# Patient Record
Sex: FEMALE | Race: WHITE | NOT HISPANIC OR LATINO | Employment: OTHER | URBAN - METROPOLITAN AREA
[De-identification: names, ages, dates, MRNs, and addresses within clinical notes are randomized per-mention and may not be internally consistent; named-entity substitution may affect disease eponyms.]

---

## 2017-01-04 ENCOUNTER — TELEPHONE (OUTPATIENT)
Dept: FAMILY MEDICINE | Facility: CLINIC | Age: 67
End: 2017-01-04

## 2017-01-04 NOTE — TELEPHONE ENCOUNTER
Called pt, she has an appt in march for annual and didn't realize time flew by and she hadn't been in. She asked about seeing the NP and advised she can do that and explained to her that she can do all what dr schwartz does minus refill narcotics. She verbally understood and scheduled annual with lorna arevalo np

## 2017-01-04 NOTE — TELEPHONE ENCOUNTER
----- Message from Kathleen Sebastian sent at 1/4/2017  9:39 AM CST -----  Contact:  call  //[phone]    Calling to   Speak to the  Nurse about    med's and   Returning to  Physical therapy// please call

## 2017-01-13 ENCOUNTER — OFFICE VISIT (OUTPATIENT)
Dept: FAMILY MEDICINE | Facility: CLINIC | Age: 67
End: 2017-01-13
Payer: MEDICARE

## 2017-01-13 ENCOUNTER — LAB VISIT (OUTPATIENT)
Dept: LAB | Facility: HOSPITAL | Age: 67
End: 2017-01-13
Attending: FAMILY MEDICINE
Payer: MEDICARE

## 2017-01-13 VITALS
WEIGHT: 232.38 LBS | HEIGHT: 63 IN | DIASTOLIC BLOOD PRESSURE: 74 MMHG | SYSTOLIC BLOOD PRESSURE: 138 MMHG | HEART RATE: 60 BPM | BODY MASS INDEX: 41.18 KG/M2

## 2017-01-13 DIAGNOSIS — M50.30 DDD (DEGENERATIVE DISC DISEASE), CERVICAL: ICD-10-CM

## 2017-01-13 DIAGNOSIS — M51.36 DDD (DEGENERATIVE DISC DISEASE), LUMBAR: ICD-10-CM

## 2017-01-13 DIAGNOSIS — R60.0 BILATERAL EDEMA OF LOWER EXTREMITY: ICD-10-CM

## 2017-01-13 DIAGNOSIS — E66.01 MORBID OBESITY DUE TO EXCESS CALORIES: Primary | ICD-10-CM

## 2017-01-13 DIAGNOSIS — Z00.00 ROUTINE MEDICAL EXAM: ICD-10-CM

## 2017-01-13 DIAGNOSIS — Z13.6 ENCOUNTER FOR SCREENING FOR CARDIOVASCULAR DISORDERS: ICD-10-CM

## 2017-01-13 DIAGNOSIS — K21.9 GASTROESOPHAGEAL REFLUX DISEASE WITHOUT ESOPHAGITIS: ICD-10-CM

## 2017-01-13 DIAGNOSIS — I73.00 RAYNAUD'S DISEASE WITHOUT GANGRENE: ICD-10-CM

## 2017-01-13 LAB
ALBUMIN SERPL BCP-MCNC: 3.4 G/DL
ALP SERPL-CCNC: 90 U/L
ALT SERPL W/O P-5'-P-CCNC: 8 U/L
ANION GAP SERPL CALC-SCNC: 8 MMOL/L
AST SERPL-CCNC: 17 U/L
BILIRUB SERPL-MCNC: 0.5 MG/DL
BUN SERPL-MCNC: 19 MG/DL
CALCIUM SERPL-MCNC: 9.1 MG/DL
CHLORIDE SERPL-SCNC: 106 MMOL/L
CHOLEST/HDLC SERPL: 4.2 {RATIO}
CO2 SERPL-SCNC: 28 MMOL/L
CREAT SERPL-MCNC: 0.8 MG/DL
EST. GFR  (AFRICAN AMERICAN): >60 ML/MIN/1.73 M^2
EST. GFR  (NON AFRICAN AMERICAN): >60 ML/MIN/1.73 M^2
GLUCOSE SERPL-MCNC: 85 MG/DL
HDL/CHOLESTEROL RATIO: 24 %
HDLC SERPL-MCNC: 196 MG/DL
HDLC SERPL-MCNC: 47 MG/DL
LDLC SERPL CALC-MCNC: 124.2 MG/DL
NONHDLC SERPL-MCNC: 149 MG/DL
POTASSIUM SERPL-SCNC: 4.7 MMOL/L
PROT SERPL-MCNC: 6.6 G/DL
SODIUM SERPL-SCNC: 142 MMOL/L
TRIGL SERPL-MCNC: 124 MG/DL

## 2017-01-13 PROCEDURE — 99499 UNLISTED E&M SERVICE: CPT | Mod: S$GLB,,, | Performed by: NURSE PRACTITIONER

## 2017-01-13 PROCEDURE — 80061 LIPID PANEL: CPT

## 2017-01-13 PROCEDURE — 36415 COLL VENOUS BLD VENIPUNCTURE: CPT | Mod: PO

## 2017-01-13 PROCEDURE — 99999 PR PBB SHADOW E&M-EST. PATIENT-LVL III: CPT | Mod: PBBFAC,,, | Performed by: NURSE PRACTITIONER

## 2017-01-13 PROCEDURE — 99397 PER PM REEVAL EST PAT 65+ YR: CPT | Mod: S$GLB,,, | Performed by: NURSE PRACTITIONER

## 2017-01-13 PROCEDURE — 80053 COMPREHEN METABOLIC PANEL: CPT

## 2017-01-13 RX ORDER — NIFEDIPINE 30 MG/1
30 TABLET, FILM COATED, EXTENDED RELEASE ORAL DAILY
Qty: 90 TABLET | Refills: 3 | Status: SHIPPED | OUTPATIENT
Start: 2017-01-13 | End: 2017-10-02 | Stop reason: SDUPTHER

## 2017-01-13 RX ORDER — HYDROGEN PEROXIDE 3 %
20 SOLUTION, NON-ORAL MISCELLANEOUS
COMMUNITY
End: 2020-01-01 | Stop reason: SDUPTHER

## 2017-01-13 RX ORDER — MELOXICAM 7.5 MG/1
7.5 TABLET ORAL DAILY
Qty: 30 TABLET | Refills: 5 | Status: SHIPPED | OUTPATIENT
Start: 2017-01-13 | End: 2017-10-02 | Stop reason: SDUPTHER

## 2017-01-13 RX ORDER — MELOXICAM 7.5 MG/1
7.5 TABLET ORAL DAILY
COMMUNITY
End: 2017-01-13 | Stop reason: SDUPTHER

## 2017-01-13 NOTE — PROGRESS NOTES
"Subjective:       Patient ID: Dari Polanco is a 66 y.o. female.    Chief Complaint: Annual Exam and Medication Refill (mobic, nefidipine)    HPI Comments: Here today for routine medical exam. Doing well    Past Medical History:    Arthritis                                                     Bronchitis                                                    Cancer                                                          Comment:history of basal skin cancer status post                excision    GERD (gastroesophageal reflux disease)                        Obesity                                                       Raynaud's syndrome                                            Retinal migraine                                              Tobacco dependence                                            Past Surgical History:    BACK SURGERY                                                    SECTION, CLASSIC                                      UPPER GASTROINTESTINAL ENDOSCOPY                 ?  (R*      Comment:"Acid reflux"    COLONOSCOPY                                      ?  (R*      Comment:"Normal"    Review of patient's family history indicates:    Esophageal cancer              Father                    Cancer                         Mother                      Comment: 'spinal column'    Diabetes                       Maternal Grandmother      Heart disease                  Maternal Grandfather        Social History    Marital status: Single              Spouse name:                       Years of education:                 Number of children:               Social History Main Topics    Smoking status: Current Every Day Smoker                                                     Packs/day: 0.25      Years: 36.00          Types: Cigarettes    Smokeless status: Never Used                        Comment:  ppd    Alcohol use: No                Current Outpatient Prescriptions:  aspirin (ECOTRIN) " 81 MG EC tablet, Take 81 mg by mouth once daily., Disp: , Rfl:   esomeprazole (NEXIUM) 20 MG capsule, Take 20 mg by mouth before breakfast., Disp: , Rfl:   meloxicam (MOBIC) 7.5 MG tablet, Take 1 tablet (7.5 mg total) by mouth once daily., Disp: 30 tablet, Rfl: 5  clotrimazole-betamethasone 1-0.05% (LOTRISONE) cream, Apply topically 2 (two) times daily., Disp: 15 g, Rfl: 1  nifedipine (ADALAT CC) 30 MG TbSR, Take 1 tablet (30 mg total) by mouth once daily., Disp: 90 tablet, Rfl: 3  nystatin (MYCOSTATIN) powder, Apply topically 4 (four) times daily., Disp: 30 g, Rfl: 1    No current facility-administered medications for this visit.       Review of patient's allergies indicates:   -- Codeine -- Itching   -- Penicillins -- Rash   -- Erythromycin -- Nausea And Vomiting   -- Lidocaine     --  'feels like I am on fire'        Review of Systems   Constitutional: Negative for appetite change, fatigue and fever.   Respiratory: Negative.    Cardiovascular: Negative.    Gastrointestinal: Negative.    Neurological: Negative.        Objective:      Physical Exam   Constitutional: She is oriented to person, place, and time. She appears well-nourished. She is active and cooperative.   Cardiovascular: Normal rate, regular rhythm, normal heart sounds and intact distal pulses.    Pulmonary/Chest: Effort normal and breath sounds normal. She has no wheezes. She has no rales.   Abdominal: Soft. Bowel sounds are normal. There is no tenderness.   Musculoskeletal:        Right lower leg: She exhibits edema (1+).        Left lower leg: She exhibits edema (1+). She exhibits no tenderness.   Neurological: She is alert and oriented to person, place, and time.   Skin: Skin is warm and dry. No rash noted.   Vitals reviewed.      Assessment:       1. Morbid obesity due to excess calories    2. DDD (degenerative disc disease), lumbar    3. Raynaud's disease without gangrene    4. Gastroesophageal reflux disease without esophagitis    5. DDD  (degenerative disc disease), cervical    6. Encounter for screening for cardiovascular disorders    7. Routine medical exam    8. Bilateral edema of lower extremity        Plan:       Dari was seen today for annual exam and medication refill.    Diagnoses and all orders for this visit:    Morbid obesity due to excess calories  Encouraged healthy eating, weight loss and exercise     DDD (degenerative disc disease), lumbar  -     meloxicam (MOBIC) 7.5 MG tablet; Take 1 tablet (7.5 mg total) by mouth once daily.  -     Ambulatory Referral to Physical/Occupational Therapy    Raynaud's disease without gangrene  -     nifedipine (ADALAT CC) 30 MG TbSR; Take 1 tablet (30 mg total) by mouth once daily.    Gastroesophageal reflux disease without esophagitis  Stable- continue Nexium    DDD (degenerative disc disease), cervical  -     meloxicam (MOBIC) 7.5 MG tablet; Take 1 tablet (7.5 mg total) by mouth once daily.  -     Ambulatory Referral to Physical/Occupational Therapy    Encounter for screening for cardiovascular disorders  -     Lipid panel; Future  -     Comprehensive metabolic panel; Future    Routine medical exam  -     Lipid panel; Future  -     Comprehensive metabolic panel; Future    Bilateral edema of lower extremity  Compression stockings- given to pt  Elevate extremities. Avoid legs in dependent positions for prolonged periods

## 2017-01-13 NOTE — MR AVS SNAPSHOT
[facility]  [address]  [facility] [address]  Phone: [phone]  Fax: [phone]                  Dari Polanco   2017 7:40 AM   Office Visit    Description:  Female : 1950   Provider:  Nandini Cristina NP   Department:  [facility]           Reason for Visit     Annual Exam     Medication Refill           Diagnoses this Visit        Comments    Morbid obesity due to excess calories    -  Primary     DDD (degenerative disc disease), lumbar         Raynaud's disease without gangrene         Gastroesophageal reflux disease without esophagitis         DDD (degenerative disc disease), cervical         Encounter for screening for cardiovascular disorders         Routine medical exam         Bilateral edema of lower extremity                To Do List           Future Appointments        Provider Department Dept Phone    2017 10:45 AM [facility] [place] [facility] [phone]    3/24/2017 1:30 PM Sam Teran MD [facility] [phone]      Goals (5 Years of Data)     None       These Medications        Disp Refills Start End    meloxicam (MOBIC) 7.5 MG tablet 30 tablet 5 2017     Take 1 tablet (7.5 mg total) by mouth once daily. - Oral    Pharmacy: [facility] [address] [place] [address] AT [facility] [address] [employer] [facility] Ph #: [phone]       nifedipine (ADALAT CC) 30 MG TbSR 90 tablet 3 2017     Take 1 tablet (30 mg total) by mouth once daily. - Oral    Pharmacy: [facility] [address] [place] [address] AT [facility] [address] [employer] [facility] Ph #: [phone]         [facility]     [facility] On Call Nurse Care Line -  Assistance  Registered nurses in the [facility] provide clinical advisement, health education, appointment booking, and other advisory services.  Call for this free service at [phone].             Medications          "  START taking these NEW medications        Refills    meloxicam (MOBIC) 7.5 MG tablet 5    Sig: Take 1 tablet (7.5 mg total) by mouth once daily.    Class: Normal    Route: Oral           Verify that the below list of medications is an accurate representation of the medications you are currently taking.  If none reported, the list may be blank. If incorrect, please contact your healthcare provider. Carry this list with you in case of emergency.           Current Medications     aspirin (ECOTRIN) 81 MG EC tablet Take 81 mg by mouth once daily.    esomeprazole (NEXIUM) 20 MG capsule Take 20 mg by mouth before breakfast.    meloxicam (MOBIC) 7.5 MG tablet Take 1 tablet (7.5 mg total) by mouth once daily.    clotrimazole-betamethasone 1-0.05% (LOTRISONE) cream Apply topically 2 (two) times daily.    nifedipine (ADALAT CC) 30 MG TbSR Take 1 tablet (30 mg total) by mouth once daily.    nystatin (MYCOSTATIN) powder Apply topically 4 (four) times daily.           Clinical Reference Information           Vital Signs - Last Recorded  Most recent update: 1/13/2017  7:57 AM by Martina Hicks LPN    BP Pulse Ht Wt BMI    138/74 (BP Location: Right arm, Patient Position: Sitting, BP Method: Manual) 60 5' 3" (1.6 m) 105.4 kg (232 lb 5.8 oz) 41.16 kg/m2      Blood Pressure          Most Recent Value    BP  138/74      Allergies as of 1/13/2017     Codeine    Penicillins    Erythromycin    Lidocaine      Immunizations Administered on Date of Encounter - 1/13/2017     None      Orders Placed During Today's Visit      Normal Orders This Visit    Ambulatory Referral to Physical/Occupational Therapy     Future Labs/Procedures Expected by Expires    Comprehensive metabolic panel  1/13/2017 4/13/2017    Lipid panel  1/13/2017 3/14/2018      Smoking Cessation     If you would like to quit smoking:   You may be eligible for free services if you are a [place] resident and started smoking cigarettes before September 1, 1988.  Call the " Smoking Cessation Trust ([facility]) toll free at ([phone] [phone] or ([phone] [phone].   Call 9-800-QUIT-NOW if you do not meet the above criteria.

## 2017-03-24 ENCOUNTER — OFFICE VISIT (OUTPATIENT)
Dept: FAMILY MEDICINE | Facility: CLINIC | Age: 67
End: 2017-03-24
Payer: MEDICARE

## 2017-03-24 ENCOUNTER — HOSPITAL ENCOUNTER (OUTPATIENT)
Dept: RADIOLOGY | Facility: HOSPITAL | Age: 67
Discharge: HOME | End: 2017-03-24
Attending: FAMILY MEDICINE
Payer: MEDICARE

## 2017-03-24 VITALS
HEIGHT: 63 IN | SYSTOLIC BLOOD PRESSURE: 120 MMHG | HEART RATE: 70 BPM | TEMPERATURE: 98 F | WEIGHT: 232.81 LBS | DIASTOLIC BLOOD PRESSURE: 64 MMHG | BODY MASS INDEX: 41.25 KG/M2 | RESPIRATION RATE: 12 BRPM

## 2017-03-24 DIAGNOSIS — Z12.31 ENCOUNTER FOR SCREENING MAMMOGRAM FOR MALIGNANT NEOPLASM OF BREAST: ICD-10-CM

## 2017-03-24 DIAGNOSIS — Z00.00 ROUTINE HEALTH MAINTENANCE: Primary | ICD-10-CM

## 2017-03-24 PROCEDURE — 99397 PER PM REEVAL EST PAT 65+ YR: CPT | Mod: S$GLB,,, | Performed by: FAMILY MEDICINE

## 2017-03-24 PROCEDURE — 99499 UNLISTED E&M SERVICE: CPT | Mod: S$GLB,,, | Performed by: FAMILY MEDICINE

## 2017-03-24 PROCEDURE — 77067 SCR MAMMO BI INCL CAD: CPT | Mod: 26,,, | Performed by: RADIOLOGY

## 2017-03-24 PROCEDURE — 77063 BREAST TOMOSYNTHESIS BI: CPT | Mod: 26,,, | Performed by: RADIOLOGY

## 2017-03-24 PROCEDURE — 99999 PR PBB SHADOW E&M-EST. PATIENT-LVL III: CPT | Mod: PBBFAC,,, | Performed by: FAMILY MEDICINE

## 2017-03-24 NOTE — PROGRESS NOTES
Patient, Dari Polanco (MRN #[id number]), presented with a recent Platelet count less than 150 K/uL consistent with the definition of thrombocytopenia (ICD10 - D69.6).    Platelets   Date Value Ref Range Status   10/14/2013 143 (L) 150 - 350 K/uL Final     The patient's thrombocytopenia was monitored, evaluated, addressed and/or treated. This addendum to the medical record is made on 03/24/2017.

## 2017-03-24 NOTE — PROGRESS NOTES
"HPI  Dari Polanco is a 66 y.o. female with multiple medical diagnoses as listed in the medical history and problem list that presents for health maintenance.  .      HPI    PAST MEDICAL HISTORY:  Past Medical History:   Diagnosis Date    Arthritis     Bronchitis     Cancer     history of basal skin cancer status post excision    GERD (gastroesophageal reflux disease)     Obesity     Raynaud's syndrome     Retinal migraine     Tobacco dependence        PAST SURGICAL HISTORY:  Past Surgical History:   Procedure Laterality Date    BACK SURGERY       SECTION, CLASSIC      COLONOSCOPY  ?  ([place]?)    "Normal"    UPPER GASTROINTESTINAL ENDOSCOPY  ?  ([place]?)    "Acid reflux"       SOCIAL HISTORY:  Social History     Social History    Marital status: Single     Spouse name: N/A    Number of children: N/A    Years of education: N/A     Occupational History    Not on file.     Social History Main Topics    Smoking status: Current Every Day Smoker     Packs/day: 0.25     Years: 36.00     Types: Cigarettes    Smokeless tobacco: Never Used      Comment:  ppd    Alcohol use No    Drug use: Not on file    Sexual activity: Not on file     Other Topics Concern    Not on file     Social History Narrative       FAMILY HISTORY:  Family History   Problem Relation Age of Onset    Esophageal cancer Father     Cancer Mother      'spinal column'    Diabetes Maternal Grandmother     Heart disease Maternal Grandfather        ALLERGIES AND MEDICATIONS: updated and reviewed.  Review of patient's allergies indicates:   Allergen Reactions    Codeine Itching    Penicillins Rash    Erythromycin Nausea And Vomiting    Lidocaine      'feels like I am on fire'     Current Outpatient Prescriptions   Medication Sig Dispense Refill    aspirin (ECOTRIN) 81 MG EC tablet Take 81 mg by mouth once daily.      clotrimazole-betamethasone 1-0.05% (LOTRISONE) cream Apply topically 2 (two) times daily. 15 " "g 1    esomeprazole (NEXIUM) 20 MG capsule Take 20 mg by mouth before breakfast.      meloxicam (MOBIC) 7.5 MG tablet Take 1 tablet (7.5 mg total) by mouth once daily. 30 tablet 5    nifedipine (ADALAT CC) 30 MG TbSR Take 1 tablet (30 mg total) by mouth once daily. 90 tablet 3    nystatin (MYCOSTATIN) powder Apply topically 4 (four) times daily. 30 g 1     No current facility-administered medications for this visit.        ROS  Review of Systems   Constitutional: Negative for fatigue, fever and unexpected weight change.   HENT: Negative for congestion, hearing loss, rhinorrhea and sore throat.    Eyes: Negative for visual disturbance.   Respiratory: Negative for cough, chest tightness, shortness of breath and wheezing.    Cardiovascular: Negative for chest pain, palpitations and leg swelling.   Gastrointestinal: Negative for abdominal distention, abdominal pain, blood in stool, constipation, diarrhea, nausea and vomiting.   Genitourinary: Negative for difficulty urinating, dysuria, frequency, hematuria, menstrual problem, pelvic pain, urgency and vaginal bleeding.   Musculoskeletal: Negative for back pain, joint swelling and neck pain.   Skin: Negative for rash.   Neurological: Negative for dizziness, tremors, weakness, light-headedness, numbness and headaches.   Psychiatric/Behavioral: Negative for confusion, dysphoric mood and sleep disturbance. The patient is nervous/anxious (with mild memory loss).        Physical Exam  Vitals:    03/24/17 1347   BP: 120/64   Pulse: 70   Resp: 12   Temp: 98.3 °F (36.8 °C)    Body mass index is 41.24 kg/(m^2).  Weight: 105.6 kg (232 lb 12.9 oz)   Height: 5' 3" (160 cm)     Physical Exam   Constitutional: She is oriented to person, place, and time. She appears well-developed and well-nourished.   HENT:   Head: Normocephalic and atraumatic.   Right Ear: External ear normal.   Left Ear: External ear normal.   Nose: Nose normal.   Mouth/Throat: Oropharynx is clear and moist. "   Eyes: Conjunctivae and EOM are normal. Pupils are equal, round, and reactive to light. No scleral icterus.   Neck: Normal range of motion. Neck supple. No JVD present. No thyromegaly present.   Cardiovascular: Normal rate, regular rhythm and normal heart sounds.  Exam reveals no gallop and no friction rub.    No murmur heard.  Pulmonary/Chest: Effort normal and breath sounds normal. She has no wheezes. She has no rales.   Abdominal: Soft. Bowel sounds are normal. She exhibits no distension and no mass. There is no tenderness. There is no rebound and no guarding.   Musculoskeletal: Normal range of motion. She exhibits no edema.   Lymphadenopathy:     She has no cervical adenopathy.   Neurological: She is alert and oriented to person, place, and time. She has normal strength. No cranial nerve deficit or sensory deficit.   Skin: Skin is warm and dry. No rash noted.   Vitals reviewed.    Results for orders placed or performed in visit on 01/13/17   Lipid panel   Result Value Ref Range    Cholesterol 196 120 - 199 mg/dL    Triglycerides 124 30 - 150 mg/dL    HDL 47 40 - 75 mg/dL    LDL Cholesterol 124.2 63.0 - 159.0 mg/dL    HDL/Chol Ratio 24.0 20.0 - 50.0 %    Total Cholesterol/HDL Ratio 4.2 2.0 - 5.0    Non-HDL Cholesterol 149 mg/dL   Comprehensive metabolic panel   Result Value Ref Range    Sodium 142 136 - 145 mmol/L    Potassium 4.7 3.5 - 5.1 mmol/L    Chloride 106 95 - 110 mmol/L    CO2 28 23 - 29 mmol/L    Glucose 85 70 - 110 mg/dL    BUN, Bld 19 8 - 23 mg/dL    Creatinine 0.8 0.5 - 1.4 mg/dL    Calcium 9.1 8.7 - 10.5 mg/dL    Total Protein 6.6 6.0 - 8.4 g/dL    Albumin 3.4 (L) 3.5 - 5.2 g/dL    Total Bilirubin 0.5 0.1 - 1.0 mg/dL    Alkaline Phosphatase 90 55 - 135 U/L    AST 17 10 - 40 U/L    ALT 8 (L) 10 - 44 U/L    Anion Gap 8 8 - 16 mmol/L    eGFR if African American >60.0 >60 mL/min/1.73 m^2    eGFR if non African American >60.0 >60 mL/min/1.73 m^2         Health Maintenance       Date Due Completion Date     TETANUS VACCINE 7/13/1966 ---    Mammogram 1/29/2017 1/29/2016    Override on 6/3/2013: Done    DEXA SCAN 10/23/2018 10/23/2015    Override on 5/16/2013: (N/S)    Lipid Panel 1/13/2022 1/13/2017    Colonoscopy 12/16/2023 12/16/2013          Assessment & Plan    Routine health maintenance  - Health maintenance reviewed  - Diet and exercise education.  - Tobacco cessation education      Return in about 1 year (around 3/24/2018).

## 2017-03-24 NOTE — MR AVS SNAPSHOT
[facility]  [address]  [place] [address]  Phone: [phone]  Fax: [phone]                  Dari Polanco   3/24/2017 1:30 PM   Office Visit    Description:  Female : 1950   Provider:  Sam Teran MD   Department:  [facility]           Diagnoses this Visit        Comments    Routine health maintenance    -  Primary            To Do List           Future Appointments        Provider Department Dept Phone    2017 9:00 AM Sam Teran MD [facility] [phone]      Goals (5 Years of Data)     None      Follow-Up and Disposition     Return in about 1 year (around 3/24/2018).    Follow-up and Disposition History      [facility] On [facility]     [facility] On Call Nurse Care Line -  Assistance  Registered nurses in the [facility] provide clinical advisement, health education, appointment booking, and other advisory services.  Call for this free service at [phone].             Medications                Verify that the below list of medications is an accurate representation of the medications you are currently taking.  If none reported, the list may be blank. If incorrect, please contact your healthcare provider. Carry this list with you in case of emergency.           Current Medications     aspirin (ECOTRIN) 81 MG EC tablet Take 81 mg by mouth once daily.    clotrimazole-betamethasone 1-0.05% (LOTRISONE) cream Apply topically 2 (two) times daily.    esomeprazole (NEXIUM) 20 MG capsule Take 20 mg by mouth before breakfast.    meloxicam (MOBIC) 7.5 MG tablet Take 1 tablet (7.5 mg total) by mouth once daily.    nifedipine (ADALAT CC) 30 MG TbSR Take 1 tablet (30 mg total) by mouth once daily.    nystatin (MYCOSTATIN) powder Apply topically 4 (four) times daily.           Clinical Reference Information           Your Vitals Were     BP Pulse Temp Resp Height Weight    120/64 (BP Location: Left arm, Patient  "Position: Sitting) 70 98.3 °F (36.8 °C) (Oral) 12 5' 3" (1.6 m) 105.6 kg (232 lb 12.9 oz)    BMI                41.24 kg/m2          Blood Pressure          Most Recent Value    BP  120/64      Allergies as of 3/24/2017     Codeine    Penicillins    Erythromycin    Lidocaine      Immunizations Administered on Date of Encounter - 3/24/2017     None      Smoking Cessation     If you would like to quit smoking:   You may be eligible for free services if you are a [place] resident and started smoking cigarettes before September 1, 1988.  Call the Smoking Cessation Trust ([facility]) toll free at (688) 779-1343 or (192) 480-8738.   Call 1-800-QUIT-NOW if you do not meet the above criteria.            Language Assistance Services     ATTENTION: Language assistance services are available, free of charge. Please call [phone].      ATENCIÓN: Si habla fabiolaañol, tiene a eagle disposición servicios gratuitos de asistencia lingüística. Llame al [phone].     JACEY Ý: N?u b?n nói Ti?ng Vi?t, có các d?ch v? h? tr? ngôn ng? mi?n phí dành cho b?n. G?i s? [phone].         [facility] complies with applicable Federal civil rights laws and does not discriminate on the basis of race, color, national origin, age, disability, or sex.        "

## 2017-08-24 ENCOUNTER — OFFICE VISIT (OUTPATIENT)
Dept: FAMILY MEDICINE | Facility: CLINIC | Age: 67
End: 2017-08-24
Payer: MEDICARE

## 2017-08-24 VITALS
SYSTOLIC BLOOD PRESSURE: 117 MMHG | DIASTOLIC BLOOD PRESSURE: 66 MMHG | WEIGHT: 230.38 LBS | BODY MASS INDEX: 40.81 KG/M2 | HEART RATE: 66 BPM

## 2017-08-24 DIAGNOSIS — Z00.00 ENCOUNTER FOR PREVENTIVE HEALTH EXAMINATION: Primary | ICD-10-CM

## 2017-08-24 DIAGNOSIS — I87.2 VENOUS INSUFFICIENCY: ICD-10-CM

## 2017-08-24 DIAGNOSIS — I73.00 RAYNAUD'S DISEASE WITHOUT GANGRENE: ICD-10-CM

## 2017-08-24 DIAGNOSIS — K21.9 GASTROESOPHAGEAL REFLUX DISEASE WITHOUT ESOPHAGITIS: ICD-10-CM

## 2017-08-24 DIAGNOSIS — M51.36 DDD (DEGENERATIVE DISC DISEASE), LUMBAR: ICD-10-CM

## 2017-08-24 DIAGNOSIS — E66.01 MORBID OBESITY DUE TO EXCESS CALORIES: ICD-10-CM

## 2017-08-24 DIAGNOSIS — Z85.828 HISTORY OF SKIN CANCER: ICD-10-CM

## 2017-08-24 DIAGNOSIS — Z72.0 TOBACCO USE: ICD-10-CM

## 2017-08-24 DIAGNOSIS — M50.30 DDD (DEGENERATIVE DISC DISEASE), CERVICAL: ICD-10-CM

## 2017-08-24 PROCEDURE — 99999 PR PBB SHADOW E&M-EST. PATIENT-LVL III: CPT | Mod: PBBFAC,,, | Performed by: NURSE PRACTITIONER

## 2017-08-24 PROCEDURE — 99499 UNLISTED E&M SERVICE: CPT | Mod: S$GLB,,, | Performed by: NURSE PRACTITIONER

## 2017-08-24 PROCEDURE — G0439 PPPS, SUBSEQ VISIT: HCPCS | Mod: S$GLB,,, | Performed by: NURSE PRACTITIONER

## 2017-08-24 NOTE — PATIENT INSTRUCTIONS
Counseling and Referral of Other Preventative  (Italic type indicates deductible and co-insurance are waived)    Patient Name: Dari Polanco  Today's Date: 8/24/2017      SERVICE LIMITATIONS RECOMMENDATION    Vaccines    · Pneumococcal (once after 65)    · Influenza (annually)    · Hepatitis B (if medium/high risk)    · Prevnar 13      Hepatitis B medium/high risk factors:       - End-stage renal disease       - Hemophiliacs who received Factor VII or         IX concentrates       - Clients of institutions for the mentally             retarded       - Persons who live in the same house as          a HepB carrier       - Homosexual men       - Illicit injectable drug abusers     Pneumococcal: Done, no repeat necessary     Influenza: Recommended to patient (september/october)     Hepatitis B: N/A     Prevnar 13: Done, no repeat necessary    Mammogram (biennial age 50-74)  Annually (age 40 or over)  Done this year, repeat every year (3/2018)    Pap (up to age 70 and after 70 if unknown history or abnormal study last 10 years)    N/A     The USPSTF recommends against screening for cervical cancer in women older than age 65 years who have had adequate prior screening and are not otherwise at high risk for cervical cancer.      Colorectal cancer screening (to age 75)    · Fecal occult blood test (annual)  · Flexible sigmoidoscopy (5y)  · Screening colonoscopy (10y)  · Barium enema   Last done 12/2013, recommend to repeat every 7-8 years  [id number]    Diabetes self-management training (no USPSTF recommendations)  Requires referral by treating physician for patient with diabetes or renal disease. 10 hours of initial DSMT sessions of no less than 30 minutes each in a continuous 12-month period. 2 hours of follow-up DSMT in subsequent years.  N/A    Bone mass measurements (age 65 & older, biennial)  Requires diagnosis related to osteoporosis or estrogen deficiency. Biennial benefit unless patient has history of long-term  glucocorticoid  Last done 10/2015, recommend to repeat every 3 years  10/2018    Glaucoma screening (no USPSTF recommendation)  Diabetes mellitus, family history   , age 50 or over    American, age 65 or over  N/A    Medical nutrition therapy for diabetes or renal disease (no recommended schedule)  Requires referral by treating physician for patient with diabetes or renal disease or kidney transplant within the past 3 years.  Can be provided in same year as diabetes self-management training (DSMT), and CMS recommends medical nutrition therapy take place after DSMT. Up to 3 hours for initial year and 2 hours in subsequent years.  N/A    Cardiovascular screening blood tests (every 5 years)  · Fasting lipid panel  Order as a panel if possible  Done this year, repeat every year (1/2018)    Diabetes screening tests (at least every 3 years, Medicare covers annually or at 6-month intervals for prediabetic patients)  · Fasting blood sugar (FBS) or glucose tolerance test (GTT)  Patient must be diagnosed with one of the following:       - Hypertension       - Dyslipidemia       - Obesity (BMI 30kg/m2)       - Previous elevated impaired FBS or GTT       ... or any two of the following:       - Overweight (BMI 25 but <30)       - Family history of diabetes       - Age 65 or older       - History of gestational diabetes or birth of baby weighing more than 9 pounds Done this year, repeat every year (1/2018)    HIV screening (annually for increased risk patients)  · HIV-1 and HIV-2 by EIA, or PASTOR, rapid antibody test or oral mucosa transudate  Patients must be at increased risk for HIV infection per USPSTF guidelines or pregnant. Tests covered annually for patient at increased risk or as requested by the patient. Pregnant patients may receive up to 3 tests during pregnancy.  Risks discussed, screening is not recommended    Smoking cessation counseling (up to 8 sessions per year)  Patients must be  asymptomatic of tobacco-related conditions to receive as a preventative service.  Counseled for 3-10 minutes.    Subsequent annual wellness visit  At least 12 months since last AWV  Return in one year     The following information is provided to all patients.  This information is to help you find resources for any of the problems found today that may be affecting your health:                Living healthy guide: www.[facility].[place].[facility]      Understanding Diabetes: www.diabetes.org      Eating healthy: www.cdc.gov/healthyweight      CDC home safety checklist: www.cdc.gov/steadi/patient.html      Agency on Aging: www.goea.[place].[facility]      Alcoholics anonymous (AA): www.aa.org      Physical Activity: www.naomie.nih.gov/qc4rdgv      Tobacco use: www.quitwithusla.org

## 2017-08-24 NOTE — PROGRESS NOTES
Dari Polanco presented for a  Medicare AWV and comprehensive Health Risk Assessment today. The following components were reviewed and updated:    · Medical history  · Family History  · Social history  · Allergies and Current Medications  · Health Risk Assessment  · Health Maintenance  · Care Team     Review of Systems   Constitutional: Negative for fever and malaise/fatigue.   Respiratory: Negative for cough and shortness of breath.    Cardiovascular: Negative for chest pain and leg swelling.   Gastrointestinal: Negative for abdominal pain, blood in stool, constipation, diarrhea, nausea and vomiting.   Skin: Negative for rash.   Neurological: Negative for dizziness and weakness.     * See Completed Assessments for Annual Wellness Visit within the encounter summary.**     The following assessments were completed:  · Living Situation  · CAGE  · Depression Screening  · Timed Get Up and Go  · Whisper Test  · Cognitive Function Screening      · Nutrition Screening  · ADL Screening  · PAQ Screening    Vitals:    08/24/17 1259   BP: 117/66   Pulse: 66   Weight: 104.5 kg (230 lb 6.1 oz)     Body mass index is 40.81 kg/m².  Physical Exam   Constitutional: She is oriented to person, place, and time. She appears well-nourished.   Cardiovascular: Normal rate, regular rhythm, normal heart sounds and intact distal pulses.    Pulmonary/Chest: Effort normal and breath sounds normal. She has no wheezes. She has no rales.   Neurological: She is alert and oriented to person, place, and time.   Skin: Skin is warm and dry. No rash noted.   Vitals reviewed.        Diagnoses and health risks identified today and associated recommendations/orders:    1. Encounter for preventive health examination  Reviewed and discussed health maintenance.   Written rx given to patient to update tetanus vaccine    2. Tobacco use  Discussed smoking cessation. Not willing to quit smoking at this time    3. Gastroesophageal reflux disease without  esophagitis  Stable- continue current treatment and routine follow ups with PCP    4. Raynaud's disease without gangrene  Stable- continue current treatment and routine follow ups with PCP    5. Venous insufficiency  Stable- continue current treatment and routine follow ups with PCP  Compression stocking  Elevate extremities    6. DDD (degenerative disc disease), lumbar  Stable- no new complaints. Follow up prn    7. DDD (degenerative disc disease), cervical  Stable- no new complaints. Follow up prn    8. Morbid obesity due to excess calories  Encouraged healthy eating, weight loss and routine exercise    9. History of skin cancer  Routinely yearly follow ups with derm  (Dr. Navarrete)    Provided Dari with a 5-10 year written screening schedule and personal prevention plan. Recommendations were developed using the USPSTF age appropriate recommendations. Education, counseling, and referrals were provided as needed. After Visit Summary printed and given to patient which includes a list of additional screenings\tests needed.    Nandini Cristina NP

## 2017-09-27 ENCOUNTER — LAB VISIT (OUTPATIENT)
Dept: LAB | Facility: HOSPITAL | Age: 67
End: 2017-09-27
Attending: FAMILY MEDICINE
Payer: MEDICARE

## 2017-09-27 ENCOUNTER — OFFICE VISIT (OUTPATIENT)
Dept: FAMILY MEDICINE | Facility: CLINIC | Age: 67
End: 2017-09-27
Payer: MEDICARE

## 2017-09-27 VITALS
HEIGHT: 63 IN | HEART RATE: 72 BPM | BODY MASS INDEX: 40.08 KG/M2 | WEIGHT: 226.19 LBS | DIASTOLIC BLOOD PRESSURE: 68 MMHG | SYSTOLIC BLOOD PRESSURE: 112 MMHG

## 2017-09-27 DIAGNOSIS — R53.83 FATIGUE, UNSPECIFIED TYPE: Primary | ICD-10-CM

## 2017-09-27 DIAGNOSIS — R53.83 FATIGUE, UNSPECIFIED TYPE: ICD-10-CM

## 2017-09-27 LAB — TSH SERPL DL<=0.005 MIU/L-ACNC: 1.56 UIU/ML

## 2017-09-27 PROCEDURE — 99213 OFFICE O/P EST LOW 20 MIN: CPT | Mod: S$GLB,,, | Performed by: FAMILY MEDICINE

## 2017-09-27 PROCEDURE — 3008F BODY MASS INDEX DOCD: CPT | Mod: S$GLB,,, | Performed by: FAMILY MEDICINE

## 2017-09-27 PROCEDURE — G0008 ADMIN INFLUENZA VIRUS VAC: HCPCS | Mod: S$GLB,,, | Performed by: FAMILY MEDICINE

## 2017-09-27 PROCEDURE — 1159F MED LIST DOCD IN RCRD: CPT | Mod: S$GLB,,, | Performed by: FAMILY MEDICINE

## 2017-09-27 PROCEDURE — 84443 ASSAY THYROID STIM HORMONE: CPT

## 2017-09-27 PROCEDURE — 1126F AMNT PAIN NOTED NONE PRSNT: CPT | Mod: S$GLB,,, | Performed by: FAMILY MEDICINE

## 2017-09-27 PROCEDURE — 90662 IIV NO PRSV INCREASED AG IM: CPT | Mod: S$GLB,,, | Performed by: FAMILY MEDICINE

## 2017-09-27 PROCEDURE — 99999 PR PBB SHADOW E&M-EST. PATIENT-LVL III: CPT | Mod: PBBFAC,,, | Performed by: FAMILY MEDICINE

## 2017-09-27 PROCEDURE — 36415 COLL VENOUS BLD VENIPUNCTURE: CPT | Mod: PO

## 2017-09-27 NOTE — MEDICAL/APP STUDENT
Subjective:       Patient ID: Dari Polanco is a 67 y.o. female.    Chief Complaint: Fatigue and Leg Pain (R leg)    6 Month follow up with new complaint of 'busted vein' in medial portion of lower leg. Complaining of leg pain and new superficial veins 2-3 days ago in this area. History of blood clot in her leg after her 3rd child.      Review of Systems   Constitutional: Positive for fatigue. Negative for fever and unexpected weight change.   HENT: Negative for postnasal drip, rhinorrhea, sinus pain and sore throat.    Eyes: Negative.    Respiratory: Negative for apnea, cough, choking, chest tightness and shortness of breath.    Cardiovascular: Negative for chest pain, palpitations and leg swelling.   Gastrointestinal: Negative for abdominal distention, abdominal pain, constipation, diarrhea and nausea.   Endocrine: Negative.    Genitourinary: Negative for difficulty urinating, flank pain and genital sores.   Musculoskeletal: Positive for myalgias. Negative for back pain.   Skin: Positive for color change (Superficial veins).   Allergic/Immunologic: Negative.    Neurological: Negative.    Hematological: Negative.    Psychiatric/Behavioral: Negative.        Objective:       Vitals:    09/27/17 0847   BP: 112/68   Pulse: 72     Physical Exam   Constitutional: She appears well-developed and well-nourished.   HENT:   Head: Normocephalic and atraumatic.   Right Ear: External ear normal.   Left Ear: External ear normal.   Nose: Nose normal.   Mouth/Throat: Oropharynx is clear and moist.   Eyes: Conjunctivae and EOM are normal. Pupils are equal, round, and reactive to light.   Neck: Normal range of motion. Neck supple.   Cardiovascular: Normal rate, regular rhythm, normal heart sounds and intact distal pulses.    Pulmonary/Chest: Effort normal and breath sounds normal.   Abdominal: Soft. Bowel sounds are normal.   Musculoskeletal: Normal range of motion.   Neurological: She is alert.   Skin: Skin is warm and dry.    Edema 2+ to thigh       Assessment:   -Raynaud's  -GERD  -Obesity    Plan:     -Lipids, CBC, CMP, TSH    Lizabeth ANAND4    Lizabeth Faulknre MS4

## 2017-10-01 NOTE — PROGRESS NOTES
Subjective:       Patient ID: Dari Polanco is a 67 y.o. female    Chief Complaint: Fatigue and Leg Pain (R leg)    HPI  Patient here for interval evaluation. She has no new complaints but has continued difficulty with fatigue  She reports no chest pain, palpitations or dyspnea.  She has mild lower limb discomfort, slightly worse on the right.    Review of Systems      Objective:   Physical Exam   Constitutional: She appears well-developed and well-nourished.   HENT:   Head: Normocephalic and atraumatic.   Eyes: Conjunctivae and EOM are normal. Pupils are equal, round, and reactive to light. No scleral icterus.   Neck: Normal range of motion. Neck supple. No thyromegaly present.   Cardiovascular: Normal rate, regular rhythm and normal heart sounds.  Exam reveals no gallop and no friction rub.    No murmur heard.  Pulmonary/Chest: Effort normal and breath sounds normal. No respiratory distress. She has no wheezes. She has no rales.   Lymphadenopathy:     She has no cervical adenopathy.   Vitals reviewed.        Assessment:       1. Fatigue, unspecified type  TSH         Plan:       Fatigue, unspecified type  -     TSH; Future; Expected date: 09/27/2017  - Discussed possible etiologies, which likely represent a combination of stress and debility    Other orders  -     Influenza - High Dose (65+) (PF) (IM)

## 2017-10-02 DIAGNOSIS — M51.36 DDD (DEGENERATIVE DISC DISEASE), LUMBAR: ICD-10-CM

## 2017-10-02 DIAGNOSIS — I73.00 RAYNAUD'S DISEASE WITHOUT GANGRENE: ICD-10-CM

## 2017-10-02 DIAGNOSIS — M50.30 DDD (DEGENERATIVE DISC DISEASE), CERVICAL: ICD-10-CM

## 2017-10-02 RX ORDER — MELOXICAM 7.5 MG/1
7.5 TABLET ORAL DAILY
Qty: 30 TABLET | Refills: 3 | Status: SHIPPED | OUTPATIENT
Start: 2017-10-02 | End: 2018-06-08 | Stop reason: SDUPTHER

## 2017-10-02 RX ORDER — NIFEDIPINE 30 MG/1
30 TABLET, FILM COATED, EXTENDED RELEASE ORAL DAILY
Qty: 90 TABLET | Refills: 3 | Status: SHIPPED | OUTPATIENT
Start: 2017-10-02 | End: 2018-11-20 | Stop reason: SDUPTHER

## 2018-01-09 ENCOUNTER — TELEPHONE (OUTPATIENT)
Dept: FAMILY MEDICINE | Facility: CLINIC | Age: 68
End: 2018-01-09

## 2018-01-09 NOTE — TELEPHONE ENCOUNTER
----- Message from Arianna Santizo sent at 1/9/2018 10:03 AM CST -----  Contact patient to advise if she received the flu and pneumonia shot this year at [phone].    Thank you

## 2018-01-09 NOTE — TELEPHONE ENCOUNTER
Spoke to patient and stated to her that she received both pneumonia vaccines and flu shot. Verbalized understanding.

## 2018-04-26 ENCOUNTER — OFFICE VISIT (OUTPATIENT)
Dept: FAMILY MEDICINE | Facility: CLINIC | Age: 68
End: 2018-04-26
Payer: MEDICARE

## 2018-04-26 VITALS
WEIGHT: 221.56 LBS | HEART RATE: 76 BPM | DIASTOLIC BLOOD PRESSURE: 67 MMHG | SYSTOLIC BLOOD PRESSURE: 127 MMHG | HEIGHT: 63 IN | BODY MASS INDEX: 39.26 KG/M2

## 2018-04-26 VITALS
HEART RATE: 76 BPM | SYSTOLIC BLOOD PRESSURE: 127 MMHG | DIASTOLIC BLOOD PRESSURE: 67 MMHG | HEIGHT: 63 IN | WEIGHT: 221.56 LBS | BODY MASS INDEX: 39.26 KG/M2

## 2018-04-26 DIAGNOSIS — I87.2 VENOUS INSUFFICIENCY: ICD-10-CM

## 2018-04-26 DIAGNOSIS — Z00.00 ENCOUNTER FOR PREVENTIVE HEALTH EXAMINATION: Primary | ICD-10-CM

## 2018-04-26 DIAGNOSIS — R60.9 DEPENDENT EDEMA: ICD-10-CM

## 2018-04-26 DIAGNOSIS — E66.01 MORBID OBESITY DUE TO EXCESS CALORIES: ICD-10-CM

## 2018-04-26 DIAGNOSIS — Z72.0 TOBACCO USE: ICD-10-CM

## 2018-04-26 DIAGNOSIS — K21.9 GASTROESOPHAGEAL REFLUX DISEASE WITHOUT ESOPHAGITIS: ICD-10-CM

## 2018-04-26 DIAGNOSIS — I73.00 RAYNAUD'S DISEASE WITHOUT GANGRENE: ICD-10-CM

## 2018-04-26 DIAGNOSIS — B37.2 CANDIDIASIS, INTERTRIGINOUS: Primary | ICD-10-CM

## 2018-04-26 PROCEDURE — 99214 OFFICE O/P EST MOD 30 MIN: CPT | Mod: S$GLB,,, | Performed by: PHYSICIAN ASSISTANT

## 2018-04-26 PROCEDURE — G0439 PPPS, SUBSEQ VISIT: HCPCS | Mod: S$GLB,,, | Performed by: NURSE PRACTITIONER

## 2018-04-26 PROCEDURE — 99999 PR PBB SHADOW E&M-EST. PATIENT-LVL IV: CPT | Mod: PBBFAC,,, | Performed by: NURSE PRACTITIONER

## 2018-04-26 PROCEDURE — 99499 UNLISTED E&M SERVICE: CPT | Mod: S$PBB,,, | Performed by: NURSE PRACTITIONER

## 2018-04-26 PROCEDURE — 99999 PR PBB SHADOW E&M-EST. PATIENT-LVL III: CPT | Mod: PBBFAC,,, | Performed by: PHYSICIAN ASSISTANT

## 2018-04-26 RX ORDER — FLUCONAZOLE 150 MG/1
150 TABLET ORAL DAILY
Qty: 1 TABLET | Refills: 0 | Status: SHIPPED | OUTPATIENT
Start: 2018-04-26 | End: 2018-04-27

## 2018-04-26 RX ORDER — CLOTRIMAZOLE AND BETAMETHASONE DIPROPIONATE 10; .64 MG/G; MG/G
CREAM TOPICAL 2 TIMES DAILY
Qty: 45 G | Refills: 1 | Status: SHIPPED | OUTPATIENT
Start: 2018-04-26 | End: 2018-05-17

## 2018-04-26 NOTE — PATIENT INSTRUCTIONS
Counseling and Referral of Other Preventative  (Italic type indicates deductible and co-insurance are waived)    Patient Name: Dari Polanco  Today's Date: 4/26/2018    Health Maintenance       Date Due Completion Date    Mammogram 03/27/2018 3/27/2017    Override on 6/3/2013: Done    DEXA SCAN 10/23/2018 10/23/2015    Override on 5/16/2013: (N/S)    Colonoscopy 12/16/2020 12/16/2013    Lipid Panel 01/13/2022 1/13/2017    TETANUS VACCINE 08/24/2027 8/24/2017        No orders of the defined types were placed in this encounter.    The following information is provided to all patients.  This information is to help you find resources for any of the problems found today that may be affecting your health:                Living healthy guide: www.[facility].[place].gov      Understanding Diabetes: www.diabetes.org      Eating healthy: www.cdc.gov/healthyweight      [facility] home safety checklist: www.cdc.gov/steadi/patient.html      Agency on Aging: www.goea.[place].[facility]      Alcoholics anonymous (AA): www.aa.org      Physical Activity: www.naomie.nih.gov/fe8aomr      Tobacco use: www.quitwithusla.org

## 2018-04-26 NOTE — PROGRESS NOTES
"aDri Polanco presented for a  Medicare AWV and comprehensive Health Risk Assessment today. The following components were reviewed and updated:    · Medical history  · Family History  · Social history  · Allergies and Current Medications  · Health Risk Assessment  · Health Maintenance  · Care Team     Review of Systems   Constitutional: Negative for chills, fever, malaise/fatigue and weight loss.   Respiratory: Negative for cough and wheezing.    Cardiovascular: Negative for chest pain.   Gastrointestinal: Negative for abdominal pain, blood in stool, constipation, diarrhea, nausea and vomiting.   Skin: Negative for rash.   Neurological: Negative for dizziness, weakness and headaches.     ** See Completed Assessments for Annual Wellness Visit within the encounter summary.**     The following assessments were completed:  · Living Situation  · CAGE  · Depression Screening  · Timed Get Up and Go  · Whisper Test  · Cognitive Function Screening      · Nutrition Screening  · ADL Screening  · PAQ Screening    Vitals:    04/26/18 0747   BP: 127/67   BP Location: Left arm   Patient Position: Sitting   BP Method: Large (Automatic)   Pulse: 76   Weight: 100.5 kg (221 lb 9 oz)   Height: 5' 3" (1.6 m)     Body mass index is 39.25 kg/m².  Physical Exam   Constitutional: She is oriented to person, place, and time. She appears well-nourished.   Cardiovascular: Normal rate, regular rhythm, normal heart sounds and intact distal pulses.    Pulmonary/Chest: Effort normal and breath sounds normal. She has no wheezes. She has no rales.   Neurological: She is alert and oriented to person, place, and time.   Skin: Skin is warm and dry.   Vitals reviewed.        Diagnoses and health risks identified today and associated recommendations/orders:    1. Encounter for preventive health examination  Reviewed and discussed health maintenance.      2. Raynaud's disease without gangrene  Stable- continue current treatment and follow up routinely with " PCP     3. Venous insufficiency  Stable- continue current treatment and follow up routinely with PCP   Compression stockings, elevate extremities    4. Morbid obesity due to excess calories  Encouraged healthy eating, weight loss and routine exercise     5. Gastroesophageal reflux disease without esophagitis  Stable- continue current treatment and follow up routinely with PCP     6. Tobacco use  Discussed smoking cessation program. Patient declines at this time due to transportation issues    I offered to discuss end of life issues, including information on how to make advance directives that the patient could use to name someone who would make medical decisions on their behalf if they became too ill to make themselves.    ___Patient declined  _X_Patient is interested, I provided paper work and offered to discuss.    Provided Dari with a 5-10 year written screening schedule and personal prevention plan. Recommendations were developed using the USPSTF age appropriate recommendations. Education, counseling, and referrals were provided as needed. After Visit Summary printed and given to patient which includes a list of additional screenings\tests needed.    Nandini Cristina, NP

## 2018-04-26 NOTE — PROGRESS NOTES
Subjective:       Patient ID: Dari Polanco is a 67 y.o. female    Chief Complaint: Leg Swelling (dry legs,red and hot in inside of ankles,also thinks might have a infection under belly(wants you to check and tell her what do you think))    HPI  Mrs Polanco presents today CO swelling and redness in her feet and lower legs that has been going on for many years.  She wants to make sure its nothing to be concerned about.  She denies any pain in her legs, no numbness or tingling.  Also she has a rash on her lower abdomen that is itchy and getting progressively worse.      Review of Systems   Constitutional: Negative for activity change, chills and fever.   HENT: Negative for congestion and sore throat.    Eyes: Negative for visual disturbance.   Respiratory: Negative for cough and shortness of breath.    Cardiovascular: Positive for leg swelling. Negative for chest pain and palpitations.   Gastrointestinal: Negative for abdominal pain, diarrhea, nausea and vomiting.   Endocrine: Negative for polydipsia and polyuria.   Musculoskeletal: Negative for myalgias.   Skin: Positive for rash.   Neurological: Negative for headaches.   Psychiatric/Behavioral: The patient is not nervous/anxious.         Objective:   Physical Exam   Constitutional: She is oriented to person, place, and time. She appears well-developed. No distress.   obese   HENT:   Head: Normocephalic and atraumatic.   Eyes: EOM are normal. Pupils are equal, round, and reactive to light.   Neck: Normal range of motion. Neck supple.   Cardiovascular: Normal rate, regular rhythm, normal heart sounds and intact distal pulses.  Exam reveals no gallop and no friction rub.    No murmur heard.  Pulmonary/Chest: Effort normal and breath sounds normal. No respiratory distress. She has no wheezes. She has no rales. She exhibits no tenderness.   Abdominal: Soft. Bowel sounds are normal. She exhibits no distension and no mass. There is no tenderness. There is no guarding.    Musculoskeletal: Normal range of motion. She exhibits edema (mild non-pitting edema present in the lower legs bilaterally, nmild erythema of the feet and midway up the lower legs, no rash, no calf ttp).   Neurological: She is alert and oriented to person, place, and time.   Skin: Skin is warm and dry. Rash (erythema and scaly rash at the superior pelvic area under the abdominal flap) noted. She is not diaphoretic.   Psychiatric: She has a normal mood and affect. Her behavior is normal. Judgment and thought content normal.        Assessment:       1. Candidiasis, intertriginous  clotrimazole-betamethasone 1-0.05% (LOTRISONE) cream    fluconazole (DIFLUCAN) 150 MG Tab   2. Dependent edema          Plan:       Candidiasis, intertriginous  -     clotrimazole-betamethasone 1-0.05% (LOTRISONE) cream; Apply topically 2 (two) times daily.  Dispense: 45 g; Refill: 1  -     fluconazole (DIFLUCAN) 150 MG Tab; Take 1 tablet (150 mg total) by mouth once daily.  Dispense: 1 tablet; Refill: 0    Dependent edema  - continue compression stocking and elevate feet above the level of your heart while resting

## 2018-04-26 NOTE — PROGRESS NOTES
Patient, Dari Polanco (MRN #[id number]), presented with a recent Platelet count less than 150 K/uL consistent with the definition of thrombocytopenia (ICD10 - D69.6).    Platelets   Date Value Ref Range Status   10/14/2013 143 (L) 150 - 350 K/uL Final     The patient's thrombocytopenia was monitored, evaluated, addressed and/or treated. This addendum to the medical record is made on 04/26/2018.

## 2018-06-08 DIAGNOSIS — M51.36 DDD (DEGENERATIVE DISC DISEASE), LUMBAR: ICD-10-CM

## 2018-06-08 DIAGNOSIS — M50.30 DDD (DEGENERATIVE DISC DISEASE), CERVICAL: ICD-10-CM

## 2018-06-09 RX ORDER — MELOXICAM 7.5 MG/1
TABLET ORAL
Qty: 90 TABLET | Refills: 0 | Status: SHIPPED | OUTPATIENT
Start: 2018-06-09 | End: 2018-11-20 | Stop reason: SDUPTHER

## 2018-06-11 ENCOUNTER — HOSPITAL ENCOUNTER (OUTPATIENT)
Dept: RADIOLOGY | Facility: HOSPITAL | Age: 68
Discharge: HOME | End: 2018-06-11
Attending: PHYSICIAN ASSISTANT
Payer: MEDICARE

## 2018-06-11 ENCOUNTER — OFFICE VISIT (OUTPATIENT)
Dept: FAMILY MEDICINE | Facility: CLINIC | Age: 68
End: 2018-06-11
Payer: MEDICARE

## 2018-06-11 VITALS
OXYGEN SATURATION: 99 % | BODY MASS INDEX: 38.44 KG/M2 | DIASTOLIC BLOOD PRESSURE: 78 MMHG | SYSTOLIC BLOOD PRESSURE: 126 MMHG | TEMPERATURE: 98 F | HEIGHT: 63 IN | HEART RATE: 71 BPM | WEIGHT: 216.94 LBS | RESPIRATION RATE: 18 BRPM

## 2018-06-11 DIAGNOSIS — M54.50 LOW BACK PAIN, NON-SPECIFIC: ICD-10-CM

## 2018-06-11 DIAGNOSIS — M54.40 ACUTE MIDLINE LOW BACK PAIN WITH SCIATICA, SCIATICA LATERALITY UNSPECIFIED: Primary | ICD-10-CM

## 2018-06-11 DIAGNOSIS — M62.830 LUMBAR PARASPINAL MUSCLE SPASM: ICD-10-CM

## 2018-06-11 DIAGNOSIS — M54.10 RADICULITIS: ICD-10-CM

## 2018-06-11 DIAGNOSIS — M51.36 DDD (DEGENERATIVE DISC DISEASE), LUMBAR: ICD-10-CM

## 2018-06-11 PROCEDURE — 72110 X-RAY EXAM L-2 SPINE 4/>VWS: CPT | Mod: 26,,, | Performed by: RADIOLOGY

## 2018-06-11 PROCEDURE — 99999 PR PBB SHADOW E&M-EST. PATIENT-LVL IV: CPT | Mod: PBBFAC,,, | Performed by: PHYSICIAN ASSISTANT

## 2018-06-11 PROCEDURE — 99214 OFFICE O/P EST MOD 30 MIN: CPT | Mod: S$GLB,,, | Performed by: PHYSICIAN ASSISTANT

## 2018-06-11 PROCEDURE — 72110 X-RAY EXAM L-2 SPINE 4/>VWS: CPT | Mod: TC,FY,PO

## 2018-06-11 RX ORDER — TIZANIDINE 2 MG/1
2 TABLET ORAL EVERY 8 HOURS PRN
Qty: 30 TABLET | Refills: 0 | Status: SHIPPED | OUTPATIENT
Start: 2018-06-11 | End: 2018-06-12 | Stop reason: SDUPTHER

## 2018-06-11 NOTE — PROGRESS NOTES
Subjective:       Patient ID: Dari Polanco is a 67 y.o. female.    Chief Complaint: Sciatica (left sided back and leg pain)    HPI   Pt with acute onset of mid lumbar pain radiating down L leg x 3 days  No trauma noted  Pt has hx of lumbar DDD and previous surgery  Pain was a 10 scale when it started but now is almost completely gone with rest and positions that help  Review of Systems   Constitutional: Positive for activity change. Negative for appetite change, chills, diaphoresis, fatigue, fever and unexpected weight change.   HENT: Negative.    Eyes: Negative.    Respiratory: Negative.    Cardiovascular: Negative.    Gastrointestinal: Negative.    Endocrine: Negative.    Genitourinary: Negative.    Musculoskeletal: Positive for back pain and myalgias.   Skin: Negative.  Negative for rash.       Objective:      Physical Exam   Constitutional: She appears well-developed and well-nourished. No distress.   HENT:   Head: Normocephalic and atraumatic.   Eyes: Conjunctivae are normal. No scleral icterus.   Neck: Normal range of motion. Neck supple. No tracheal deviation present. No thyromegaly present.   Musculoskeletal: She exhibits tenderness. She exhibits no edema.   Tight and mildly tender lumbar paravertebral muscles  Mid vertebral tenderness in L3 and L4 area with radicular pain to the L leg  Mild bilat sciatic tenderness noted   Lymphadenopathy:     She has no cervical adenopathy.   Skin: Skin is warm and dry. No rash noted.   Vitals reviewed.      Assessment:       1. Acute midline low back pain with sciatica, sciatica laterality unspecified    2. DDD (degenerative disc disease), lumbar    3. Radiculitis    4. Low back pain, non-specific    5. Lumbar paraspinal muscle spasm        Plan:       Acute midline low back pain with sciatica, sciatica laterality unspecified  -     tiZANidine (ZANAFLEX) 2 MG tablet; Take 1 tablet (2 mg total) by mouth every 8 (eight) hours as needed.  Dispense: 30 tablet; Refill:  0  -     Ambulatory referral to Pain Clinic    DDD (degenerative disc disease), lumbar  -     tiZANidine (ZANAFLEX) 2 MG tablet; Take 1 tablet (2 mg total) by mouth every 8 (eight) hours as needed.  Dispense: 30 tablet; Refill: 0  -     Ambulatory referral to Pain Clinic    Radiculitis  -     tiZANidine (ZANAFLEX) 2 MG tablet; Take 1 tablet (2 mg total) by mouth every 8 (eight) hours as needed.  Dispense: 30 tablet; Refill: 0  -     Ambulatory referral to Pain Clinic    Low back pain, non-specific  -     X-Ray Lumbar Spine Complete 5 View; Future; Expected date: 06/11/2018  -     tiZANidine (ZANAFLEX) 2 MG tablet; Take 1 tablet (2 mg total) by mouth every 8 (eight) hours as needed.  Dispense: 30 tablet; Refill: 0  -     Ambulatory referral to Pain Clinic    Lumbar paraspinal muscle spasm  -     tiZANidine (ZANAFLEX) 2 MG tablet; Take 1 tablet (2 mg total) by mouth every 8 (eight) hours as needed.  Dispense: 30 tablet; Refill: 0  -     Ambulatory referral to Pain Clinic    I have reviewed pt. Lumbar xrays which show severe lumbar DDD and previous surgery    Discussed otc's  Discussed home PT  Discussed PT referral if improvement is not noted

## 2018-06-12 DIAGNOSIS — M62.830 LUMBAR PARASPINAL MUSCLE SPASM: ICD-10-CM

## 2018-06-12 DIAGNOSIS — M54.40 ACUTE MIDLINE LOW BACK PAIN WITH SCIATICA, SCIATICA LATERALITY UNSPECIFIED: ICD-10-CM

## 2018-06-12 DIAGNOSIS — M54.50 LOW BACK PAIN, NON-SPECIFIC: ICD-10-CM

## 2018-06-12 DIAGNOSIS — M51.36 DDD (DEGENERATIVE DISC DISEASE), LUMBAR: ICD-10-CM

## 2018-06-12 DIAGNOSIS — M54.10 RADICULITIS: ICD-10-CM

## 2018-06-12 RX ORDER — TIZANIDINE 2 MG/1
2 TABLET ORAL EVERY 8 HOURS PRN
Qty: 30 TABLET | Refills: 0 | Status: SHIPPED | OUTPATIENT
Start: 2018-06-12 | End: 2018-06-22

## 2018-06-15 ENCOUNTER — TELEPHONE (OUTPATIENT)
Dept: FAMILY MEDICINE | Facility: CLINIC | Age: 68
End: 2018-06-15

## 2018-06-15 DIAGNOSIS — M43.16 SPONDYLOLISTHESIS OF LUMBAR REGION: Primary | ICD-10-CM

## 2018-06-15 NOTE — TELEPHONE ENCOUNTER
----- Message from Giovanny Bowles sent at 6/15/2018  2:54 PM CDT -----  Contact: self   Patient want to know if Dr. Teran made a decision yet from earlier conversation with the nurse please call back at [phone] (home)

## 2018-06-15 NOTE — TELEPHONE ENCOUNTER
Spoke to pt. Pt states she saw PATRICK Frank on Monday for sciatica, pt states she was prescribed a muscle relaxer and was referred to pain management. Pt states when she woke up this morning, she couldn't feel or move her legs, pt states she rubbed her legs and was then able to move them. Pt states she would just like Dr. Teran to look over the note from Monday and the xray results and let her know what she needs to do, if she needs to see neurology or pain management. Please advise.

## 2018-06-15 NOTE — TELEPHONE ENCOUNTER
Spoke with patient and scheduled an appointment to see Charity Fairchild 6-18-18 and she indicated understanding.

## 2018-06-15 NOTE — TELEPHONE ENCOUNTER
----- Message from Edilma Louis sent at 6/15/2018  7:44 AM CDT -----  Contact: Dari  Patient is calling to let know when woke up this morning could not feel either of her legs; took about forty five minutes to maneuver around of getting out of bed and able to walk. States was discussed on Monday visit with Albaro Barnett about seeing a neurologist. Please call to advise [phone] or [phone]. Thanks!

## 2018-06-18 ENCOUNTER — OFFICE VISIT (OUTPATIENT)
Dept: SPINE | Facility: CLINIC | Age: 68
End: 2018-06-18
Payer: MEDICARE

## 2018-06-18 VITALS
BODY MASS INDEX: 36.27 KG/M2 | HEIGHT: 65 IN | SYSTOLIC BLOOD PRESSURE: 122 MMHG | HEART RATE: 83 BPM | WEIGHT: 217.69 LBS | DIASTOLIC BLOOD PRESSURE: 68 MMHG

## 2018-06-18 DIAGNOSIS — M54.50 LOW BACK PAIN, NON-SPECIFIC: ICD-10-CM

## 2018-06-18 DIAGNOSIS — Z98.1 HISTORY OF LUMBAR FUSION: Primary | ICD-10-CM

## 2018-06-18 DIAGNOSIS — M54.42 ACUTE LEFT-SIDED LOW BACK PAIN WITH LEFT-SIDED SCIATICA: ICD-10-CM

## 2018-06-18 PROCEDURE — 99999 PR PBB SHADOW E&M-EST. PATIENT-LVL III: CPT | Mod: PBBFAC,,, | Performed by: PHYSICIAN ASSISTANT

## 2018-06-18 PROCEDURE — 99203 OFFICE O/P NEW LOW 30 MIN: CPT | Mod: S$GLB,,, | Performed by: PHYSICIAN ASSISTANT

## 2018-06-18 RX ORDER — TRIPROLIDINE/PSEUDOEPHEDRINE 2.5MG-60MG
400 TABLET ORAL EVERY 4 HOURS PRN
COMMUNITY
End: 2018-11-26 | Stop reason: ALTCHOICE

## 2018-06-18 NOTE — PROGRESS NOTES
Neurosurgery History & Physical    Patient ID: Dari Polanco is a 67 y.o. female.    Chief Complaint   Patient presents with    Low-back Pain     Has had pain in lower back since last Friday. Feels like something is tearing in her lower back. Muscle relaxers and Advil help pain.    Leg Pain     Has pain going down left leg.       Review of Systems   Constitutional: Negative for activity change, appetite change, chills, fever and unexpected weight change.   HENT: Negative for tinnitus, trouble swallowing and voice change.    Respiratory: Negative for apnea, cough, chest tightness and shortness of breath.    Cardiovascular: Negative for chest pain and palpitations.   Gastrointestinal: Negative for constipation, diarrhea, nausea and vomiting.   Genitourinary: Negative for difficulty urinating, dysuria, frequency and urgency.   Musculoskeletal: Positive for back pain. Negative for gait problem, neck pain and neck stiffness.   Skin: Negative for wound.   Neurological: Positive for weakness and numbness. Negative for dizziness, tremors, seizures, facial asymmetry, speech difficulty, light-headedness and headaches.   Psychiatric/Behavioral: Negative for confusion and decreased concentration.       Past Medical History:   Diagnosis Date    Arthritis     Bronchitis     Cancer     history of basal skin cancer status post excision    GERD (gastroesophageal reflux disease)     Obesity     Raynaud's syndrome     Retinal migraine     Tobacco dependence      Social History     Social History    Marital status: Single     Spouse name: N/A    Number of children: N/A    Years of education: N/A     Occupational History    Not on file.     Social History Main Topics    Smoking status: Current Every Day Smoker     Packs/day: 0.25     Years: 36.00     Types: Cigarettes    Smokeless tobacco: Never Used      Comment: 1/2 ppd    Alcohol use No    Drug use: Unknown    Sexual activity: Not on file     Other Topics  "Concern    Not on file     Social History Narrative    No narrative on file     Family History   Problem Relation Age of Onset    Esophageal cancer Father     Cancer Mother         'spinal column'    Diabetes Maternal Grandmother     Heart disease Maternal Grandfather      Review of patient's allergies indicates:   Allergen Reactions    Codeine Itching    Penicillins Rash    Erythromycin Nausea And Vomiting    Lidocaine      'feels like I am on fire'       Current Outpatient Prescriptions:     esomeprazole (NEXIUM) 20 MG capsule, Take 20 mg by mouth before breakfast., Disp: , Rfl:     ibuprofen (ADVIL,MOTRIN) 100 mg/5 mL suspension, Take 400 mg by mouth every 4 (four) hours as needed for Temperature greater than., Disp: , Rfl:     nifedipine (ADALAT CC) 30 MG TbSR, Take 1 tablet (30 mg total) by mouth once daily., Disp: 90 tablet, Rfl: 3    tiZANidine (ZANAFLEX) 2 MG tablet, Take 1 tablet (2 mg total) by mouth every 8 (eight) hours as needed., Disp: 30 tablet, Rfl: 0    aspirin (ECOTRIN) 81 MG EC tablet, Take 81 mg by mouth once daily., Disp: , Rfl:     meloxicam (MOBIC) 7.5 MG tablet, TAKE 1 TABLET(7.5 MG) BY MOUTH EVERY DAY, Disp: 90 tablet, Rfl: 0    Vitals:    06/18/18 1004   BP: 122/68   Pulse: 83   Weight: 98.8 kg (217 lb 11.3 oz)   Height: 5' 5" (1.651 m)       Physical Exam   Constitutional: She is oriented to person, place, and time. She appears well-developed and well-nourished.   HENT:   Head: Normocephalic and atraumatic.   Eyes: Pupils are equal, round, and reactive to light.   Neck: Normal range of motion. Neck supple.   Cardiovascular: Normal rate.    Pulmonary/Chest: Effort normal.   Musculoskeletal: Normal range of motion. She exhibits no edema.   Neurological: She is alert and oriented to person, place, and time.   Reflex Scores:       Tricep reflexes are 2+ on the right side and 2+ on the left side.       Bicep reflexes are 2+ on the right side and 2+ on the left side.       " Brachioradialis reflexes are 2+ on the right side and 2+ on the left side.       Patellar reflexes are 2+ on the right side and 2+ on the left side.       Achilles reflexes are 2+ on the right side and 2+ on the left side.  Skin: Skin is warm, dry and intact.   Psychiatric: She has a normal mood and affect. Her speech is normal and behavior is normal. Judgment and thought content normal.   Nursing note and vitals reviewed.      Neurologic Exam     Mental Status   Oriented to person, place, and time.   Speech: speech is normal     Cranial Nerves     CN III, IV, VI   Pupils are equal, round, and reactive to light.    Gait, Coordination, and Reflexes     Reflexes   Right brachioradialis: 2+  Left brachioradialis: 2+  Right biceps: 2+  Left biceps: 2+  Right triceps: 2+  Left triceps: 2+  Right patellar: 2+  Left patellar: 2+  Right achilles: 2+  Left achilles: 2+      Provider dictation:  67 year old obese female with history of deggenerative disk dessication and prior lumbar fusion is referred by Dr. Teran for evaluation of back pain.  She underwent L3/4 lumbar fusion prior to 2015 (she is not sure when) and has done relatively well with some lower back pain depending on activity since then.  Her current pain started 6-8-18 without focal trauma.  She has pain across the lower back with radiation to the left lateral leg to the foot and into the groin as well.  It is associated with numbness of the left leg and in the morning weakness in the leg.  She has had one isolated event of weakness in both legs in the morning where she states she could not stand up.  She has been taking ibuprofen and zanaflex for help with pain.  She has not had PT or DEVANTE.  Oswestry score: 51%.  PHQ:  14.    She has an antalgic gait and walks slowly.  She has 5/5 strength eith 2+ muscle stretch reflexes and no sensory deficits.    I have reveiwed xrays of the lumbar spine from 6-11-18 demonstrating disk space narrowing at T12/L2, L1/2 and  L2/3.  Post op fusion changes are seen at L3/4 with pedicle screws and fusion álvaro.  Bones appear osteopenic.  There is an exageratted lumbar lordosis.      Ms. Chapin has history of L3/4 lumbar fusion with acute onset lower back pain, left leg pain and leg weakness.  We will obtain an MRI and xrays of the lumbar spine to update imaging and assess for cause of intermittent weakness as she has full strenght today.  folllow up in clinic after imaging is complete.  We will also obtain records from Dr. Jimenez to get operative reports.  Follow up as needed.    Visit Diagnosis:  History of lumbar fusion  -     MRI Lumbar Spine Without Contrast; Future; Expected date: 06/18/2018  -     X-Ray Lumbar Spine Flexion And Extension Only; Future; Expected date: 06/18/2018    Low back pain, non-specific  -     MRI Lumbar Spine Without Contrast; Future; Expected date: 06/18/2018  -     X-Ray Lumbar Spine Flexion And Extension Only; Future; Expected date: 06/18/2018    Acute left-sided low back pain with left-sided sciatica  -     MRI Lumbar Spine Without Contrast; Future; Expected date: 06/18/2018  -     X-Ray Lumbar Spine Flexion And Extension Only; Future; Expected date: 06/18/2018        Total time spent counseling greater than fifty percent of total visit time.  Counseling included discussion regarding imaging findings, diagnosis possibilities, treatment options, risks and benefits.   The patient had many questions regarding the options and long-term effects.

## 2018-06-18 NOTE — LETTER
June 18, 2018      Sam Teran MD  [address] [facility] [address] [place] [address]           [facility]  [address] [facility] [address]  [facility] [address]  Phone: [phone]  Fax: [phone]          Patient: Dari Polanco   MR Number: [id number]   YOB: 1950   Date of Visit: 6/18/2018       Dear Dr. Sam Teran:    Thank you for referring Dari Polanco to me for evaluation. Attached you will find relevant portions of my assessment and plan of care.    If you have questions, please do not hesitate to call me. I look forward to following Dari Polanco along with you.    Sincerely,    Charity Fairchild PA-C    Enclosure  CC:  No Recipients    If you would like to receive this communication electronically, please contact [email or web address] or [phone] to request more information on [facility] ([facility] Link access.    For providers and/or their staff who would like to refer a patient to [facility], please contact us through our one-stop-shop provider referral line, [facility], at [phone].    If you feel you have received this communication in error or would no longer like to receive these types of communications, please e-mail [email or web address]

## 2018-06-27 ENCOUNTER — HOSPITAL ENCOUNTER (OUTPATIENT)
Dept: RADIOLOGY | Facility: HOSPITAL | Age: 68
Discharge: HOME | End: 2018-06-27
Attending: PHYSICIAN ASSISTANT
Payer: MEDICARE

## 2018-06-27 DIAGNOSIS — M54.42 ACUTE LEFT-SIDED LOW BACK PAIN WITH LEFT-SIDED SCIATICA: ICD-10-CM

## 2018-06-27 DIAGNOSIS — Z98.1 HISTORY OF LUMBAR FUSION: ICD-10-CM

## 2018-06-27 DIAGNOSIS — M54.50 LOW BACK PAIN, NON-SPECIFIC: ICD-10-CM

## 2018-06-27 PROCEDURE — 72148 MRI LUMBAR SPINE W/O DYE: CPT | Mod: 26,,, | Performed by: RADIOLOGY

## 2018-06-27 PROCEDURE — 72100 X-RAY EXAM L-S SPINE 2/3 VWS: CPT | Mod: TC,FY,PO

## 2018-06-27 PROCEDURE — 72100 X-RAY EXAM L-S SPINE 2/3 VWS: CPT | Mod: 26,,, | Performed by: RADIOLOGY

## 2018-06-27 PROCEDURE — 72148 MRI LUMBAR SPINE W/O DYE: CPT | Mod: TC,PO

## 2018-07-06 ENCOUNTER — OFFICE VISIT (OUTPATIENT)
Dept: SPINE | Facility: CLINIC | Age: 68
End: 2018-07-06
Payer: MEDICARE

## 2018-07-06 VITALS
RESPIRATION RATE: 18 BRPM | HEIGHT: 65 IN | BODY MASS INDEX: 36.29 KG/M2 | WEIGHT: 217.81 LBS | SYSTOLIC BLOOD PRESSURE: 117 MMHG | DIASTOLIC BLOOD PRESSURE: 71 MMHG | HEART RATE: 85 BPM

## 2018-07-06 DIAGNOSIS — Z98.1 HISTORY OF LUMBAR FUSION: ICD-10-CM

## 2018-07-06 DIAGNOSIS — M54.50 LOW BACK PAIN, NON-SPECIFIC: Primary | ICD-10-CM

## 2018-07-06 DIAGNOSIS — M51.26 HERNIATED LUMBAR INTERVERTEBRAL DISC: ICD-10-CM

## 2018-07-06 DIAGNOSIS — M47.816 LUMBAR SPONDYLOSIS: ICD-10-CM

## 2018-07-06 PROCEDURE — 99214 OFFICE O/P EST MOD 30 MIN: CPT | Mod: S$GLB,,, | Performed by: PHYSICIAN ASSISTANT

## 2018-07-06 PROCEDURE — 99999 PR PBB SHADOW E&M-EST. PATIENT-LVL IV: CPT | Mod: PBBFAC,,, | Performed by: PHYSICIAN ASSISTANT

## 2018-07-06 NOTE — PROGRESS NOTES
Neurosurgery History & Physical    Patient ID: Dari Polanco is a 67 y.o. female.    Chief Complaint   Patient presents with    Follow-up     MRI/X-ray Results       Review of Systems   Constitutional: Negative for activity change, appetite change, chills, fever and unexpected weight change.   HENT: Negative for tinnitus, trouble swallowing and voice change.    Respiratory: Negative for apnea, cough, chest tightness and shortness of breath.    Cardiovascular: Negative for chest pain and palpitations.   Gastrointestinal: Negative for constipation, diarrhea, nausea and vomiting.   Genitourinary: Negative for difficulty urinating, dysuria, frequency and urgency.   Musculoskeletal: Positive for back pain. Negative for gait problem, neck pain and neck stiffness.   Skin: Negative for wound.   Neurological: Positive for weakness and numbness. Negative for dizziness, tremors, seizures, facial asymmetry, speech difficulty, light-headedness and headaches.   Psychiatric/Behavioral: Negative for confusion and decreased concentration.       Past Medical History:   Diagnosis Date    Arthritis     Bronchitis     Cancer     history of basal skin cancer status post excision    GERD (gastroesophageal reflux disease)     Obesity     Raynaud's syndrome     Retinal migraine     Tobacco dependence      Social History     Social History    Marital status: Single     Spouse name: N/A    Number of children: N/A    Years of education: N/A     Occupational History    Not on file.     Social History Main Topics    Smoking status: Current Every Day Smoker     Packs/day: 0.25     Years: 36.00     Types: Cigarettes    Smokeless tobacco: Never Used      Comment: 1/2 ppd    Alcohol use No    Drug use: Unknown    Sexual activity: Not on file     Other Topics Concern    Not on file     Social History Narrative    No narrative on file     Family History   Problem Relation Age of Onset    Esophageal cancer Father     Cancer  "Mother         'spinal column'    Diabetes Maternal Grandmother     Heart disease Maternal Grandfather      Review of patient's allergies indicates:   Allergen Reactions    Codeine Itching    Penicillins Rash    Erythromycin Nausea And Vomiting    Lidocaine      'feels like I am on fire'       Current Outpatient Prescriptions:     ibuprofen (ADVIL,MOTRIN) 100 mg/5 mL suspension, Take 400 mg by mouth every 4 (four) hours as needed for Temperature greater than., Disp: , Rfl:     nifedipine (ADALAT CC) 30 MG TbSR, Take 1 tablet (30 mg total) by mouth once daily., Disp: 90 tablet, Rfl: 3    aspirin (ECOTRIN) 81 MG EC tablet, Take 81 mg by mouth once daily., Disp: , Rfl:     esomeprazole (NEXIUM) 20 MG capsule, Take 20 mg by mouth before breakfast., Disp: , Rfl:     meloxicam (MOBIC) 7.5 MG tablet, TAKE 1 TABLET(7.5 MG) BY MOUTH EVERY DAY, Disp: 90 tablet, Rfl: 0    Vitals:    07/06/18 0926   BP: 117/71   BP Location: Right arm   Patient Position: Sitting   Pulse: 85   Resp: 18   Weight: 98.8 kg (217 lb 13 oz)   Height: 5' 5" (1.651 m)       Physical Exam   Constitutional: She is oriented to person, place, and time. She appears well-developed and well-nourished.   HENT:   Head: Normocephalic and atraumatic.   Eyes: Pupils are equal, round, and reactive to light.   Neck: Normal range of motion. Neck supple.   Cardiovascular: Normal rate.    Pulmonary/Chest: Effort normal.   Musculoskeletal: Normal range of motion. She exhibits no edema.   Neurological: She is alert and oriented to person, place, and time.   Reflex Scores:       Tricep reflexes are 2+ on the right side and 2+ on the left side.       Bicep reflexes are 2+ on the right side and 2+ on the left side.       Brachioradialis reflexes are 2+ on the right side and 2+ on the left side.       Patellar reflexes are 2+ on the right side and 2+ on the left side.       Achilles reflexes are 2+ on the right side and 2+ on the left side.  Skin: Skin is warm, dry " "and intact.   Psychiatric: She has a normal mood and affect. Her speech is normal and behavior is normal. Judgment and thought content normal.   Nursing note and vitals reviewed.      Neurologic Exam     Mental Status   Oriented to person, place, and time.   Speech: speech is normal     Cranial Nerves     CN III, IV, VI   Pupils are equal, round, and reactive to light.    Gait, Coordination, and Reflexes     Reflexes   Right brachioradialis: 2+  Left brachioradialis: 2+  Right biceps: 2+  Left biceps: 2+  Right triceps: 2+  Left triceps: 2+  Right patellar: 2+  Left patellar: 2+  Right achilles: 2+  Left achilles: 2+      Provider dictation:  67 year old obese female with history of degenerative disk dessication and prior lumbar fusion was initially referred by Dr. Teran for evaluation of back/ left leg pain has undergone an MRI and presents to discuss results.  Since her last visit, pain is improving and she feels better overall.  Per my last note:  "She underwent L3/4 lumbar fusion prior to 2015 (she is not sure when) and has done relatively well with some lower back pain depending on activity since then.  Her current pain started 6-8-18 without focal trauma.  She has pain across the lower back with radiation to the left lateral leg to the foot and into the groin as well.  It is associated with numbness of the left leg and in the morning weakness in the leg.  She has had one isolated event of weakness in both legs in the morning where she states she could not stand up.  She has been taking ibuprofen and zanaflex for help with pain.  She has not had PT or DEVANTE."  Oswestry score: 51%.  PHQ:  14.    She has an antalgic gait and walks slowly.  She has 5/5 strength with 2+ muscle stretch reflexes and no sensory deficits.    I have reveiwed xrays of the lumbar spine from 6-11-18 demonstrating disk space narrowing at T12/L2, L1/2 and L2/3.  Post op fusion changes are seen at L3/4 with pedicle screws and fusion álvaro.  " Bones appear osteopenic.  There is an exageratted lumbar lordosis.      I have reviewed an MRI lumbar spine from 6/27/18 demonstrating an exaggerated lumbar lordosis.  There are normal post operataive posterior fusion changes at L3/4.  There is severe degenerative disk dessication at L2/3 without significant central canal or foraminal narrowing.  There is L4/5 facet arthropathy and left disk hernia with left foraminal narrowing.  There is a broad disk asymmetric to the left L5/S1 with some left foraminal narrowing.     Ms. Chapin has history of L3/4 lumbar fusion without complication.  She has left foraminal narrowing due to disc hernia and spondylosis at L4/5 greater than L5/S1 with left L5 radiculopathy.  The changes at L4/5 and L5/S1 only explain the pain and not the generalized weakness she has felt throughout the entire left leg at times.  We discusses options of PT, DEVANTE and if no improvement surgical intervention.  I am referring to PT and pain management.  Follow up if no improvement at which time we will arrange for her to see a surgeon.     Visit Diagnosis:  Low back pain, non-specific  -     Ambulatory referral to Pain Clinic  -     Ambulatory Referral to Physical/Occupational Therapy    History of lumbar fusion  -     Ambulatory referral to Pain Clinic  -     Ambulatory Referral to Physical/Occupational Therapy    Herniated lumbar intervertebral disc  -     Ambulatory referral to Pain Clinic  -     Ambulatory Referral to Physical/Occupational Therapy    Lumbar spondylosis  -     Ambulatory referral to Pain Clinic  -     Ambulatory Referral to Physical/Occupational Therapy        Total time spent counseling greater than fifty percent of total visit time.  Counseling included discussion regarding imaging findings, diagnosis possibilities, treatment options, risks and benefits.   The patient had many questions regarding the options and long-term effects.

## 2018-07-16 ENCOUNTER — OFFICE VISIT (OUTPATIENT)
Dept: FAMILY MEDICINE | Facility: CLINIC | Age: 68
End: 2018-07-16
Payer: MEDICARE

## 2018-07-16 VITALS
RESPIRATION RATE: 16 BRPM | DIASTOLIC BLOOD PRESSURE: 62 MMHG | BODY MASS INDEX: 36.26 KG/M2 | HEIGHT: 65 IN | HEART RATE: 80 BPM | SYSTOLIC BLOOD PRESSURE: 118 MMHG | WEIGHT: 217.63 LBS

## 2018-07-16 DIAGNOSIS — Z12.31 ENCOUNTER FOR SCREENING MAMMOGRAM FOR BREAST CANCER: ICD-10-CM

## 2018-07-16 DIAGNOSIS — M51.36 DDD (DEGENERATIVE DISC DISEASE), LUMBAR: Primary | ICD-10-CM

## 2018-07-16 PROCEDURE — 99213 OFFICE O/P EST LOW 20 MIN: CPT | Mod: S$GLB,,, | Performed by: FAMILY MEDICINE

## 2018-07-16 PROCEDURE — 99999 PR PBB SHADOW E&M-EST. PATIENT-LVL III: CPT | Mod: PBBFAC,,, | Performed by: FAMILY MEDICINE

## 2018-07-16 RX ORDER — TIZANIDINE HYDROCHLORIDE 2 MG/1
1 CAPSULE, GELATIN COATED ORAL NIGHTLY
COMMUNITY
End: 2019-03-13

## 2018-07-16 NOTE — PROGRESS NOTES
Subjective:       Patient ID: Dari Polanco is a 68 y.o. female    Chief Complaint: Follow-up and Back Pain    HPI  Here today for interval evaluation.    Reports increased drowsiness from Zanaflex.  Improved from lower dose.  Her sciatic symptoms have improved.    Review of Systems     Objective:   Physical Exam   Constitutional: She is oriented to person, place, and time. She appears well-developed and well-nourished.   Neurological: She is alert and oriented to person, place, and time.   Vitals reviewed.       Assessment:       1. DDD (degenerative disc disease), lumbar  Ambulatory Referral to Physical/Occupational Therapy        Plan:       DDD (degenerative disc disease), lumbar  -     Ambulatory Referral to Physical/Occupational Therapy  - Reviewed Lumbar MRI  - Await Pain Management

## 2018-07-31 ENCOUNTER — CLINICAL SUPPORT (OUTPATIENT)
Dept: REHABILITATION | Facility: HOSPITAL | Age: 68
End: 2018-07-31
Payer: MEDICARE

## 2018-07-31 DIAGNOSIS — M54.42 CHRONIC BILATERAL LOW BACK PAIN WITH LEFT-SIDED SCIATICA: Primary | ICD-10-CM

## 2018-07-31 DIAGNOSIS — M62.81 MUSCLE WEAKNESS: ICD-10-CM

## 2018-07-31 DIAGNOSIS — G89.29 CHRONIC BILATERAL LOW BACK PAIN WITH LEFT-SIDED SCIATICA: Primary | ICD-10-CM

## 2018-07-31 PROCEDURE — G8982 BODY POS GOAL STATUS: HCPCS | Mod: CJ,PN | Performed by: PHYSICAL THERAPIST

## 2018-07-31 PROCEDURE — 97162 PT EVAL MOD COMPLEX 30 MIN: CPT | Mod: PN | Performed by: PHYSICAL THERAPIST

## 2018-07-31 PROCEDURE — 97110 THERAPEUTIC EXERCISES: CPT | Mod: PN | Performed by: PHYSICAL THERAPIST

## 2018-07-31 PROCEDURE — G8981 BODY POS CURRENT STATUS: HCPCS | Mod: CK,PN | Performed by: PHYSICAL THERAPIST

## 2018-07-31 NOTE — PLAN OF CARE
PHYSICAL THERAPY INITIAL EVALUATION    Name:Dari Polanco  Physician:Charity Fairchild PA-C, Denny Mays MD  Date of eval:7/31/2018  Orders:  Physical Therapy evaluate and treat  Clinic: [id number]  Diagnosis:  1. Chronic bilateral low back pain with left-sided sciatica     2. Muscle weakness         Precautions: OA knees, lumbar fusion L3-4 about 7 years ago  Evaluation date: 7/31/2018  Visit # authorized: 1/20  Authorization period: 12-31-18  Plan of care expiration: 9-25-18  MD Follow up appt: none scheduled.  Dr. Maradiaga 8-22-18    Subjective     Chief complaint: LBP and L sciatica  Onset of pain : 6-7-18  Mechanism of onset :  No known injury. Pt states started with sciatica pain Pt woke up Friday with B leg numbness and could not move legs, moved legs with arm and sat for a while and tried to move feet.  Pt states gradually passed.  Went to MD Monday.  Pt still had some pain and was walking with walker. Pt ordered x-ray and medication.  Medication helped Pt no longer taking medication.  Pt states sometimes little numbness L leg upon awakening and no R LE issues  Pt is now off Advil.  Pt is to take Tylenol, but has not needed to.     Radicular symptoms:outside leg to ankle L   Bowel and Bladder incontinence:neg  Aggravating factors: standing, prolonged walking, prolonged sitting in hard chair, pain and weakness upon awakening  Easing factors: medication  Sleep is not disturbed. 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  Pt lives alone and I with ADL and household activities No stairs  Pt uses walker to get in and out of tub for safety not fear of falling just  wants to be cautious    Allergies:    Review of patient's allergies indicates:   Allergen Reactions    Codeine Itching    Penicillins Rash    Erythromycin Nausea And Vomiting    Lidocaine      'feels like I am on fire'       Medical history: LB surgery 6-7 years ago  Past Medical History:   Diagnosis Date    Arthritis     Bronchitis     Cancer     history of basal skin cancer status post excision    GERD (gastroesophageal reflux disease)     Obesity     Raynaud's syndrome     Retinal migraine     Tobacco dependence        Medication:   Current Outpatient Prescriptions on File Prior to Visit   Medication Sig Dispense Refill    aspirin (ECOTRIN) 81 MG EC tablet Take 81 mg by mouth once daily.      esomeprazole (NEXIUM) 20 MG capsule Take 20 mg by mouth before breakfast.      ibuprofen (ADVIL,MOTRIN) 100 mg/5 mL suspension Take 400 mg by mouth every 4 (four) hours as needed for Temperature greater than.      meloxicam (MOBIC) 7.5 MG tablet TAKE 1 TABLET(7.5 MG) BY MOUTH EVERY DAY 90 tablet 0    nifedipine (ADALAT CC) 30 MG TbSR Take 1 tablet (30 mg total) by mouth once daily. 90 tablet 3    tiZANidine 2 mg Cap Take 1 mg by mouth every evening. Pt only taking 1/2 of a 2mg tablet at night       No current facility-administered medications on file prior to visit.        MRI: Status post L3-4 posterior treated fusion and decompressive laminectomy.  Advanced multilevel degenerative change, resulting in mild spinal canal narrowing and multilevel neuroforaminal narrowing ranging from mild to severe.           Xray: There is no abnormal motion with flexion or extension in this spine with multilevel degenerative change and postsurgical changes.  There is stable retrolisthesis of L3 on L4 and anterolisthesis of L5 on S1.    Pain level with 0 being the lowest and 10 being the highest presently: 0  Pain level with 0 being the lowest and 10 being the highest at worst: 10  Pain level with 0 being the lowest and 10  "being the highest at best: 0     Patient Goals: "be able to walk like I used to walk and not worry about loss of balance"    Objective     Postural examination in standing:  - normal lumbar lordosis  - severe thoracic kyphosis  - forward head  - forward shoulders  - R hip high  - L shoulder high    Postural examination in sitting:   - normal lumbar lordosis  - forward head  - forward shoulders      Functional assessment:   - walking: no deficits, though difficulty with attempts at toe walking did not test heel walking  - sit to stand: mod use of hands  - sit to supine: mod difficulty       - supine to sit: mod-severe difficulty   - supine to prone: not tested    Pelvic positioning: CT L    Lumbar active range of motion in standing is:  - flexion - toes                      - extension -  10%                         - left side bending -  2" above knee pain         - right side bending -  2" above knee           Flexibility testing:  - hamstrings:     90/90 test R 20 L 30           - gastrocnemius:   DF ankle R 5 degrees L 5 degrees     Muscle Strength  MMT R L   Hip flexion 3+/5 3+/5   Hip abduction 3-/5 3-/5   Bridge 25%     Knee extension 4/5 4/5   Knee flexion 4/5 4/5   Ankle dorsiflexion 5/5 5/5       Endurance is poor.    Lumbar Special tests:  SLR neg    Palpation: min tenderness to lumbar region, mod in L SI region    Joint mobility: N/T    Sensation: Intact  Reflexes: unable to elicit B      Outcome measures:   Impairment: changing and maintaining body position  FOTO lumbar disability index: 41  PT reviewed FOTO scores for Dari Polanco on 07/31/2018.   FOTO scores were entered into [employer] - see media section.    · G-code current: CK at least 40% but < 60% impaired, limited or restricted  · G-code goal: CJ at least 20%, but < 40% impaired, limited or restricted  Severity modifier selections based on the FOTO scoring, objective findings, and co-morbidity assessment    TREATMENT:  Therapeutic exercise: Dari " received therapeutic exercises to develop strength, stabilization and endurance; flexibility and range of motion for 10 minutes including:see HEP sheet.   Glut set x 10  Piriformis stretch x 5  SLR x 5  Hip abd supine x 5  Modified push/pull    Pt. Education: Instructed pt. regarding:proper technique with all exercises. Pt. to demonstrate good understanding of the education provided. Dari demonstrated good return demonstration of activities. No cultural, environmental, or spiritual barriers identified to treatment or learning.  Assessment   This is a 68 y.o. female referred to outpatient physical therapy and presents with a medical diagnosis of LBP with history of lumbar fusion, HNP lumbar and lumbar spondylosis and PT diagnosis/findings of LBP with pelvic dysfunction with loss of ROM and strength demonstrating limitations as described in the problem list. Patient was in agreement with set goals and plan of care. Pt was given a written HEP along with posture education, instruction on body mechanics, activity modification/avoidance, and core/lumbar/LE strengthening regimen. Pt. verbally understood instructions and demonstrated proper form/technique. Pt was advised to perform these exercises free of pain, and discontinue use if symptoms persist/worsen. Pt will benefit from physical therapy services in order to maximize pain free functional independence. Rehab potential is good.    History  Co-morbidities and personal factors that may impact the plan of care Examination  Body Structures and Functions, activity limitations and participation restrictions that may impact the plan of care    Clinical Presentation   Co-morbidities:   prior lumbar surgery and OA of knees        Personal Factors:   no deficits Body Regions:   back  lower extremities  trunk    Body Systems:    ROM  strength            Participation Restrictions:   ADL     Activity limitations:   Learning and applying knowledge  no deficits    General Tasks  and Commands  no deficits    Communication  no deficits    Mobility  walking    Self care  no deficits    Domestic Life  shopping  cooking  doing house work (cleaning house, washing dishes, laundry)  assisting others    Interactions/Relationships  no deficits    Life Areas  no deficits    Community and Social Life  no deficits         evolving clinical presentation with changing clinical characteristics                      moderate   moderate  moderate Decision Making/ Complexity Score:  moderate     Medical necessity is demonstrated by the following IMPAIRMENTS/PROBLEM LIST:  Decreased range of motion  Decreased strength  Pelvic dysfunction  Increased pain with walking  Increased pain with prolonged sitting  Increased pain with prolonged standing  Increased pain upon awakening in AM  ADL and household activities lead to increased pain and are limited  Functional impairment rating of: 41, CK at least 40% but < 60% impaired, limited or restricted    GOALS:   Short Term Goals:  4 weeks  Increase range of motion 25%  Increase strength 1/2 muscle grade  Improve postural awareness of pelvis to independently identify dysfunction with min assist from PT  Be able to perform HEP with minimal cueing required  Improve functional impairment of changing and maintaining body position to 50    Long Term Goals: 8 weeks  Increase range of motion to 75% to 100% full   Improve muscle strength 1 muscle grade  Improve and stabilize proper pelvic positioning  Walking for ADL and exercise will be restored without increased pain  Restore ability to stand for ADL without increased pain  Restore ability to sit for ADL without increased pain  Restore ability to arise in AM without increased pain  Restore ability to perform ADL's and household activities independently and without increased pain  Improve functional impairment of changing and maintaining body position to CJ at least 20%, but < 40% impaired, limited or restricted    Plan     Pt  will be treated by physical therapy 1-3 times a week for 8 weeks to include: Therapeutic exercises to increase ROM, strength and stabilization; joint and soft tissue mobilization with manual therapy techniques to decrease muscle tightness, pain and improve joint mobility; neuromuscular re-education to improve balance, coordination, kinesthetic sense and proprioception, therapeutic activities to improve coordination, strength and function, therapeutic taping to decrease pain, provide support and improve function; modalities such as moist heat, ice, ultrasound and electrical stimulation to increase circulation, decrease pain and inflammation; dry needling with manual therapy techniques to decrease pain, inflammation and swelling, increase circulation and promote healing process will be considered and utilized as needed; temporary orthotics will be considered and utilized as needed to further decrease pain in WB.  Pt may be seen by PTA to carry out plan of care as part of the Rehab team.    I certify the need for these services furnished under this plan of treatment and while under my care.    ____________________________________ Physician/Referring Practitioner                                Date of Signature

## 2018-08-07 ENCOUNTER — CLINICAL SUPPORT (OUTPATIENT)
Dept: REHABILITATION | Facility: HOSPITAL | Age: 68
End: 2018-08-07
Payer: MEDICARE

## 2018-08-07 DIAGNOSIS — G89.29 CHRONIC BILATERAL LOW BACK PAIN WITH LEFT-SIDED SCIATICA: Primary | ICD-10-CM

## 2018-08-07 DIAGNOSIS — M54.42 CHRONIC BILATERAL LOW BACK PAIN WITH LEFT-SIDED SCIATICA: Primary | ICD-10-CM

## 2018-08-07 DIAGNOSIS — M62.81 MUSCLE WEAKNESS: ICD-10-CM

## 2018-08-07 PROCEDURE — 97110 THERAPEUTIC EXERCISES: CPT | Mod: PN | Performed by: PHYSICAL THERAPIST

## 2018-08-07 NOTE — PROGRESS NOTES
Physical Therapy Daily Note     Name: Dari Polanco  Clinic Number: [id number]  Diagnosis:   Encounter Diagnoses   Name Primary?    Chronic bilateral low back pain with left-sided sciatica Yes    Muscle weakness      Physician: Charity Fairchild PA-C  Treatment Orders: PT Eval and Treat  Past Medical History:   Diagnosis Date    Arthritis     Bronchitis     Cancer     history of basal skin cancer status post excision    GERD (gastroesophageal reflux disease)     Obesity     Raynaud's syndrome     Retinal migraine     Tobacco dependence      Precautions: OA knees, lumbar fusion L3-4 about 7 years ago  Evaluation date: 7/31/2018  Visit # authorized: 2/20  Authorization period: 12-31-18  Plan of care expiration: 9-25-18  MD Follow up appt: none scheduled.  Dr. Maradiaga 8-22-18    Subjective     Pt reports: feeling better and doing ex    Pain Scale: before treatment: 0.5 currently; after push/pull 0/10after treatment: 0    Objective     AR R pelvis        TREATMENT  Therapeutic exercise: Dari received therapeutic exercises to develop strength, endurance, ROM, flexibility and core stabilization for 25 minutes including:    Bridge x 10  Piriformis stretch x 10  SLR x 2/5   Pelvic tilt x 10   Trunk rotation supine x 10   Clam  x 10  Modified push/pull       Written Home Exercises Provided: indented ex noted above  Pt demo good understanding of the education provided. Dari demonstrated good return demonstration of activities.     Pt. education:  · Posture reeducation, body mechanics, HEP,   · No spiritual or educational barriers to learning provided  · Pt has no cultural, educational or language barriers to learning provided.    Assessment     Pt with improved symptoms overall and gerri progression well   Pt will continue to benefit from skilled outpatient physical therapy to address the remaining functional deficits, provide pt/family education, and to maximize pt's level of independence in the home and community  environment. .     GOALS:   Short Term Goals:  4 weeks  Increase range of motion 25%  Increase strength 1/2 muscle grade  Improve postural awareness of pelvis to independently identify dysfunction with min assist from PT  Be able to perform HEP with minimal cueing required  Improve functional impairment of changing and maintaining body position to 50     Long Term Goals: 8 weeks  Increase range of motion to 75% to 100% full   Improve muscle strength 1 muscle grade  Improve and stabilize proper pelvic positioning  Walking for ADL and exercise will be restored without increased pain  Restore ability to stand for ADL without increased pain  Restore ability to sit for ADL without increased pain  Restore ability to arise in AM without increased pain  Restore ability to perform ADL's and household activities independently and without increased pain  Improve functional impairment of changing and maintaining body position to CJ at least 20%, but < 40% impaired, limited or restricted       Anticipated barriers to physical therapy: none  Pt's spiritual, cultural and educational needs considered and pt agreeable to plan of care and goals        Plan   Continue with established Plan of Care towards PT goals.

## 2018-08-10 ENCOUNTER — CLINICAL SUPPORT (OUTPATIENT)
Dept: REHABILITATION | Facility: HOSPITAL | Age: 68
End: 2018-08-10
Payer: MEDICARE

## 2018-08-10 DIAGNOSIS — M54.42 CHRONIC BILATERAL LOW BACK PAIN WITH LEFT-SIDED SCIATICA: Primary | ICD-10-CM

## 2018-08-10 DIAGNOSIS — G89.29 CHRONIC BILATERAL LOW BACK PAIN WITH LEFT-SIDED SCIATICA: Primary | ICD-10-CM

## 2018-08-10 DIAGNOSIS — M62.81 MUSCLE WEAKNESS: ICD-10-CM

## 2018-08-10 PROCEDURE — 97110 THERAPEUTIC EXERCISES: CPT | Mod: PN | Performed by: PHYSICAL THERAPIST

## 2018-08-10 NOTE — PROGRESS NOTES
Physical Therapy Daily Note     Name: Dari Polanco  Clinic Number: [id number]  Diagnosis:   Encounter Diagnoses   Name Primary?    Chronic bilateral low back pain with left-sided sciatica Yes    Muscle weakness      Physician: Charity Fairchild PA-C  Treatment Orders: PT Eval and Treat  Past Medical History:   Diagnosis Date    Arthritis     Bronchitis     Cancer     history of basal skin cancer status post excision    GERD (gastroesophageal reflux disease)     Obesity     Raynaud's syndrome     Retinal migraine     Tobacco dependence      Precautions: OA knees, lumbar fusion L3-4 about 7 years ago  Evaluation date: 7/31/2018  Visit # authorized: 3/20  Authorization period: 12-31-18  Plan of care expiration: 9-25-18  MD Follow up appt: none scheduled.  Dr. Maradiaga 8-22-18    Subjective     Pt reports: feeling better and doing ex    Pain Scale: before treatment: 0.5 currently; after push/pull 0/10after treatment: 0    Objective     AR R pelvis    TREATMENT  Therapeutic exercise: Dari received therapeutic exercises to develop strength, endurance, ROM, flexibility and core stabilization for 25 minutes including:   Bridge x 15  Piriformis stretch x 10  SLR x 15  Pelvic tilt x 20  Trunk rotation supine x 20  Clam  x 20   Hip ext standing leaning over counter x 10  Attempted hip ext prone, difficult with performing   Modified push/pull       Written Home Exercises Provided: indented ex noted above  Pt demo good understanding of the education provided. Dari demonstrated good return demonstration of activities.     Pt. education:  · Posture reeducation, body mechanics, HEP,   · No spiritual or educational barriers to learning provided  · Pt has no cultural, educational or language barriers to learning provided.    Assessment     Pt with improved symptoms overall and gerri progression well   Pt will continue to benefit from skilled outpatient physical therapy to address the remaining functional deficits, provide  pt/family education, and to maximize pt's level of independence in the home and community environment. .     GOALS:   Short Term Goals:  4 weeks  Increase range of motion 25%  Increase strength 1/2 muscle grade  Improve postural awareness of pelvis to independently identify dysfunction with min assist from PT  Be able to perform HEP with minimal cueing required  Improve functional impairment of changing and maintaining body position to 50     Long Term Goals: 8 weeks  Increase range of motion to 75% to 100% full   Improve muscle strength 1 muscle grade  Improve and stabilize proper pelvic positioning  Walking for ADL and exercise will be restored without increased pain  Restore ability to stand for ADL without increased pain  Restore ability to sit for ADL without increased pain  Restore ability to arise in AM without increased pain  Restore ability to perform ADL's and household activities independently and without increased pain  Improve functional impairment of changing and maintaining body position to CJ at least 20%, but < 40% impaired, limited or restricted       Anticipated barriers to physical therapy: none  Pt's spiritual, cultural and educational needs considered and pt agreeable to plan of care and goals        Plan   Continue with established Plan of Care towards PT goals.

## 2018-08-14 ENCOUNTER — CLINICAL SUPPORT (OUTPATIENT)
Dept: REHABILITATION | Facility: HOSPITAL | Age: 68
End: 2018-08-14
Payer: MEDICARE

## 2018-08-14 DIAGNOSIS — M54.42 CHRONIC BILATERAL LOW BACK PAIN WITH LEFT-SIDED SCIATICA: Primary | ICD-10-CM

## 2018-08-14 DIAGNOSIS — M62.81 MUSCLE WEAKNESS: ICD-10-CM

## 2018-08-14 DIAGNOSIS — G89.29 CHRONIC BILATERAL LOW BACK PAIN WITH LEFT-SIDED SCIATICA: Primary | ICD-10-CM

## 2018-08-14 PROCEDURE — 97110 THERAPEUTIC EXERCISES: CPT | Mod: PN | Performed by: PHYSICAL THERAPIST

## 2018-08-14 NOTE — PROGRESS NOTES
Physical Therapy Daily Note     Name: Dari Polanco  Clinic Number: [id number]  Diagnosis:   Encounter Diagnoses   Name Primary?    Chronic bilateral low back pain with left-sided sciatica Yes    Muscle weakness      Physician: Charity Fairchild PA-C  Treatment Orders: PT Eval and Treat  Past Medical History:   Diagnosis Date    Arthritis     Bronchitis     Cancer     history of basal skin cancer status post excision    GERD (gastroesophageal reflux disease)     Obesity     Raynaud's syndrome     Retinal migraine     Tobacco dependence      Precautions: OA knees, lumbar fusion L3-4 about 7 years ago  Evaluation date: 7/31/2018  Visit # authorized: 4/20  Authorization period: 12-31-18  Plan of care expiration: 9-25-18  MD Follow up appt: none scheduled.  Dr. Maradiaga 8-22-18    Subjective     Pt reports: feeling good and not much better Pt able to walk dog without difficulty    Pain Scale: before treatment: 0 currently; after push/pull 0/10after treatment: 0    Objective     AR R pelvis    TREATMENT  Therapeutic exercise: Dari received therapeutic exercises to develop strength, endurance, ROM, flexibility and core stabilization for 25 minutes including:   Bridge x 15  Piriformis stretch x 10  SLR x 15  Pelvic tilt x 20  Trunk rotation supine x 20  Clam  x 20  Hip ext standing leaning over counter x 10   Hip abd standing x 10     Modified push/pull    Instructed pt further in pelvic girdle anatomy and biomechanics and need for continued work on HEP        Written Home Exercises Provided: indented ex noted above  Pt demo good understanding of the education provided. Dari demonstrated good return demonstration of activities.     Pt. education:  · Posture reeducation, body mechanics, HEP,   · No spiritual or educational barriers to learning provided  · Pt has no cultural, educational or language barriers to learning provided.    Assessment     Pt with improved symptoms overall and gerri progression well Pt  appears to understand need to stay on top of need for push/pull  Pt progressing with function with ability to walk dog without pain   Pt will continue to benefit from skilled outpatient physical therapy to address the remaining functional deficits, provide pt/family education, and to maximize pt's level of independence in the home and community environment. .     GOALS:   Short Term Goals:  4 weeks  Increase range of motion 25%  Increase strength 1/2 muscle grade  Improve postural awareness of pelvis to independently identify dysfunction with min assist from PT  Be able to perform HEP with minimal cueing required  Improve functional impairment of changing and maintaining body position to 50     Long Term Goals: 8 weeks  Increase range of motion to 75% to 100% full   Improve muscle strength 1 muscle grade  Improve and stabilize proper pelvic positioning  Walking for ADL and exercise will be restored without increased pain  Restore ability to stand for ADL without increased pain  Restore ability to sit for ADL without increased pain  Restore ability to arise in AM without increased pain  Restore ability to perform ADL's and household activities independently and without increased pain  Improve functional impairment of changing and maintaining body position to CJ at least 20%, but < 40% impaired, limited or restricted       Anticipated barriers to physical therapy: none  Pt's spiritual, cultural and educational needs considered and pt agreeable to plan of care and goals        Plan   Continue with established Plan of Care towards PT goals.

## 2018-08-16 ENCOUNTER — CLINICAL SUPPORT (OUTPATIENT)
Dept: REHABILITATION | Facility: HOSPITAL | Age: 68
End: 2018-08-16
Payer: MEDICARE

## 2018-08-16 DIAGNOSIS — M62.81 MUSCLE WEAKNESS: ICD-10-CM

## 2018-08-16 DIAGNOSIS — G89.29 CHRONIC BILATERAL LOW BACK PAIN WITH LEFT-SIDED SCIATICA: Primary | ICD-10-CM

## 2018-08-16 DIAGNOSIS — M54.42 CHRONIC BILATERAL LOW BACK PAIN WITH LEFT-SIDED SCIATICA: Primary | ICD-10-CM

## 2018-08-16 PROCEDURE — 97110 THERAPEUTIC EXERCISES: CPT | Mod: PN

## 2018-08-16 PROCEDURE — 97110 THERAPEUTIC EXERCISES: CPT | Mod: PN | Performed by: PHYSICAL THERAPIST

## 2018-08-16 NOTE — PROGRESS NOTES
Physical Therapy Daily Note     Name: Dari Polanco  Clinic Number: [id number]  Diagnosis:   Encounter Diagnoses   Name Primary?    Chronic bilateral low back pain with left-sided sciatica Yes    Muscle weakness      Physician: Charity Fairchild PA-C  Treatment Orders: PT Eval and Treat  Past Medical History:   Diagnosis Date    Arthritis     Bronchitis     Cancer     history of basal skin cancer status post excision    GERD (gastroesophageal reflux disease)     Obesity     Raynaud's syndrome     Retinal migraine     Tobacco dependence      Precautions: OA knees, lumbar fusion L3-4 about 7 years ago  Evaluation date: 7/31/2018  Visit # authorized: 4/20  Authorization period: 12-31-18  Plan of care expiration: 9-25-18  MD Follow up appt: none scheduled.  Dr. Maradiaga 8-22-18    Start time: 10:10  End Time: 10:54  Total treatment time: 45    Subjective     Pt reports: feeling good and not much better Pt able to walk dog without difficulty    Pain Scale: before treatment: 0 currently; after push/pull 0/10after treatment: 0    Objective     AR R pelvis    TREATMENT  Therapeutic exercise: Dari received therapeutic exercises to develop strength, endurance, ROM, flexibility and core stabilization for 45 minutes including:   Pelvic tilt x 20  Bridge x 20  (above 2 exercises performed with PT, BHAKTI Asif)    Piriformis stretch x 10  SLR x 20  Trunk rotation supine x 20  Clam  x 20  Hip ext standing leaning over counter x 20  Hip abd standing x 2/10   Heel raise x 2/10   Sit-stand from chair + airex pad x 10 (cues to avoid valgus)  Modified push/pull    NP Instructed pt further in pelvic girdle anatomy and biomechanics and need for continued work on HEP        Written Home Exercises Provided: indented ex noted above, no written instruction given as pt stated she understood the exercises and did not need pictures.  Pt demo good understanding of the education provided. Dari demonstrated good return demonstration of  activities.     Pt. education:  · Posture reeducation, body mechanics, HEP,   · No spiritual or educational barriers to learning provided  · Pt has no cultural, educational or language barriers to learning provided.    Assessment     Pt with good tolerance to treatment this date without onset of symptoms into the low back or pelvis. Maintain pelvic alignment throughout session. Improved tolerance to exercises and was able to add additional exercises for strength and function. Crepitus in B knees with sit-stand which was eliminated with cues to prevent valgus.   Pt will continue to benefit from skilled outpatient physical therapy to address the remaining functional deficits, provide pt/family education, and to maximize pt's level of independence in the home and community environment. .     GOALS:   Short Term Goals:  4 weeks  Increase range of motion 25%  Increase strength 1/2 muscle grade  Improve postural awareness of pelvis to independently identify dysfunction with min assist from PT  Be able to perform HEP with minimal cueing required  Improve functional impairment of changing and maintaining body position to 50     Long Term Goals: 8 weeks  Increase range of motion to 75% to 100% full   Improve muscle strength 1 muscle grade  Improve and stabilize proper pelvic positioning  Walking for ADL and exercise will be restored without increased pain  Restore ability to stand for ADL without increased pain  Restore ability to sit for ADL without increased pain  Restore ability to arise in AM without increased pain  Restore ability to perform ADL's and household activities independently and without increased pain  Improve functional impairment of changing and maintaining body position to CJ at least 20%, but < 40% impaired, limited or restricted     Anticipated barriers to physical therapy: none  Pt's spiritual, cultural and educational needs considered and pt agreeable to plan of care and goals      Plan   Continue with  established Plan of Care towards PT goals.    Gianna Ruiz, PTA

## 2018-08-21 ENCOUNTER — CLINICAL SUPPORT (OUTPATIENT)
Dept: REHABILITATION | Facility: HOSPITAL | Age: 68
End: 2018-08-21
Payer: MEDICARE

## 2018-08-21 DIAGNOSIS — M54.42 CHRONIC BILATERAL LOW BACK PAIN WITH LEFT-SIDED SCIATICA: Primary | ICD-10-CM

## 2018-08-21 DIAGNOSIS — M62.81 MUSCLE WEAKNESS: ICD-10-CM

## 2018-08-21 DIAGNOSIS — G89.29 CHRONIC BILATERAL LOW BACK PAIN WITH LEFT-SIDED SCIATICA: Primary | ICD-10-CM

## 2018-08-21 PROCEDURE — 97110 THERAPEUTIC EXERCISES: CPT | Mod: PN | Performed by: PHYSICAL THERAPIST

## 2018-08-21 NOTE — PROGRESS NOTES
[facility] New Patient Evaluation    Referred by: Charity Fairchild PA-C   Reason for referral:  M54.5 (ICD-10-CM) - Low back pain, non-specific      Z98.1 (ICD-10-CM) - History of lumbar fusion      M51.26 (ICD-10-CM) - Herniated lumbar intervertebral disc      M47.816 (ICD-10-CM) - Lumbar spondylosis       CC:   Chief Complaint   Patient presents with    Low-back Pain     currently in PT     Last 3 PDI Scores 8/22/2018   Pain Disability Index (PDI) 6       HPI:   Dari Polanco is a 68 y.o. female who complains of low back pain     Onset: early June 2018 without inciting event  Current Pain Score: 0/10  Typical Range: 0-1/10 in the past week  Quality: Aching, Throbbing and Shooting - associated with numbness in the left leg  Radiation: down the left leg to the calf not extending into the foot  Worsened by: nothing in particular  Improved by: physical therapy    Previous Therapies:  PT/OT: Currently in PT with Connie @ [facility] - she is very satisfied with the service  HEP: Endorses daily performance  Interventions: Yes, prior to lami/fusion with good relief  Surgery:   Laminectomy and Fusion L3-4 (~2006 but she is unsure)  Medications:   - NSAIDS: Yes  - MSK Relaxants: Yes  - TCAs:   - SNRIs:   - Topicals:   - Anticonvulsants: Yes  - Opioids: Yes    Current Pain Medications:  1. Ibuprofen  2. Meloxicam  3. Tizanidine     Review of Systems:  Review of Systems   Constitutional: Negative for chills and fever.   HENT: Negative for nosebleeds.    Eyes: Negative for pain.   Respiratory: Negative for hemoptysis.    Cardiovascular: Negative for chest pain.   Gastrointestinal: Negative for nausea and vomiting.   Genitourinary: Negative for dysuria.   Musculoskeletal: Positive for back pain. Negative for falls, joint pain and myalgias.   Skin: Negative for rash.   Neurological: Positive for tingling and sensory change. Negative for focal weakness.   Endo/Heme/Allergies: Does not bruise/bleed easily.  "  Psychiatric/Behavioral: Positive for depression. Negative for substance abuse. The patient is nervous/anxious.        History:    Current Outpatient Medications:     aspirin (ECOTRIN) 81 MG EC tablet, Take 81 mg by mouth once daily., Disp: , Rfl:     esomeprazole (NEXIUM) 20 MG capsule, Take 20 mg by mouth before breakfast., Disp: , Rfl:     meloxicam (MOBIC) 7.5 MG tablet, TAKE 1 TABLET(7.5 MG) BY MOUTH EVERY DAY (Patient taking differently: TAKE 1 TABLET(7.5 MG) BY MOUTH EVERY DAY PRN), Disp: 90 tablet, Rfl: 0    nifedipine (ADALAT CC) 30 MG TbSR, Take 1 tablet (30 mg total) by mouth once daily., Disp: 90 tablet, Rfl: 3    ibuprofen (ADVIL,MOTRIN) 100 mg/5 mL suspension, Take 400 mg by mouth every 4 (four) hours as needed for Temperature greater than., Disp: , Rfl:     tiZANidine 2 mg Cap, Take 1 mg by mouth every evening. Pt only taking 1/2 of a 2mg tablet at night, Disp: , Rfl:     Past Medical History:   Diagnosis Date    Arthritis     Bronchitis     Cancer     history of basal skin cancer status post excision    GERD (gastroesophageal reflux disease)     Obesity     Raynaud's syndrome     Retinal migraine     Tobacco dependence        Past Surgical History:   Procedure Laterality Date    BACK SURGERY       SECTION, CLASSIC      COLONOSCOPY  ?  ([place]?)    "Normal"    UPPER GASTROINTESTINAL ENDOSCOPY  ?  ([place]?)    "Acid reflux"       Family History   Problem Relation Age of Onset    Esophageal cancer Father     Cancer Mother         'spinal column'    Diabetes Maternal Grandmother     Heart disease Maternal Grandfather        Social History     Socioeconomic History    Marital status: Single     Spouse name: None    Number of children: None    Years of education: None    Highest education level: None   Social Needs    Financial resource strain: None    Food insecurity - worry: None    Food insecurity - inability: None    Transportation needs - medical: None " "   Transportation needs - non-medical: None   Occupational History    None   Tobacco Use    Smoking status: Current Every Day Smoker     Packs/day: 0.25     Years: 36.00     Pack years: 9.00     Types: Cigarettes    Smokeless tobacco: Never Used    Tobacco comment: 1/2 ppd   Substance and Sexual Activity    Alcohol use: No     Alcohol/week: 0.0 oz    Drug use: None    Sexual activity: None   Other Topics Concern    None   Social History Narrative    None       Review of patient's allergies indicates:   Allergen Reactions    Codeine Itching    Penicillins Rash    Erythromycin Nausea And Vomiting    Lidocaine      'feels like I am on fire'       Physical Exam:  Vitals:    08/22/18 1026   BP: 129/71   Pulse: 87   Weight: 98.1 kg (216 lb 4.3 oz)   Height: 5' 5" (1.651 m)   PainSc: 0-No pain     General    Nursing note and vitals reviewed.  Constitutional: She is oriented to person, place, and time. She appears well-developed and well-nourished. No distress.   HENT:   Head: Normocephalic and atraumatic.   Nose: Nose normal.   Eyes: Conjunctivae and EOM are normal. Pupils are equal, round, and reactive to light. Right eye exhibits no discharge. Left eye exhibits no discharge. No scleral icterus.   Neck: No JVD present.   Cardiovascular: Intact distal pulses.    Pulmonary/Chest: Effort normal. No respiratory distress.   Abdominal: She exhibits no distension.   Neurological: She is alert and oriented to person, place, and time. Coordination normal.   Psychiatric: She has a normal mood and affect. Her behavior is normal. Judgment and thought content normal.     General Musculoskeletal Exam   Gait: normal     Back (L-Spine & T-Spine) / Neck (C-Spine) Exam     Tenderness Right paramedian tenderness of the Lower L-Spine. Left paramedian tenderness of the Lower L-Spine.     Back (L-Spine & T-Spine) Range of Motion   Back extension: facet loading is positive and exacerabtes/reproduces the patient's typical low back " pain    Back flexion: limited ROM but partial relief of low back pain noted.     Spinal Sensation   Right Side Sensation  L-Spine Level: normal  Left Side Sensation  L-Spine Level: normal    Other She has no scoliosis .      Muscle Strength   Right Lower Extremity   Hip Flexion: 5/5   Hip Extensors: 5/5  Quadriceps:  5/5   Hamstrin/5   Gastrocsoleus:  5/5/5  Left Lower Extremity   Hip Flexion: 5/5   Hip Extensors: 5/5  Quadriceps:  5/5   Hamstrin/5   Gastrocsoleus:  5/5/5    Reflexes     Left Side  Quadriceps:  2+  Achilles:  2+    Right Side   Quadriceps:  2+  Achilles:  2+      Imaging:  MRI Lumbar Spine Without Contrast 2018  There has been posterior álvaro and pedicle screw fusion bilaterally at L3-4.  L3-4 decompressive laminectomy has also been performed.  Mild retrolisthesis of L2 on L3 and L3 on L4 is present.  Marrow signal is moderately heterogeneous.  There is a prominent hemangioma of the L1 vertebral body.  The conus terminates at L2-3.  The distal cord and cauda equina are unremarkable.  Disc desiccation is present throughout the lumbar spine.  There is severe loss of disc height at L2-3 and L3-4.  Annular fissuring of the disc is present at L2-3.  L1-2: Mild generalized disc bulging is present without spinal canal or neuroforaminal narrowing.  L2-3: No disc herniation.  No significant spinal canal or neuroforaminal narrowing.  L3-4: There is mild retrolisthesis of L3 on L4, with uncovering of the posterior disc.  The spinal canal is decompressed.  Moderate right and mild left neuroforaminal narrowing is present.  L4-5: There is a small right subarticular disc extrusion along with mild generalized bulge of the disc.  Severe bilateral facet arthropathy is present.  The findings result in mild spinal canal narrowing along with severe left and mild right neuroforaminal narrowing.  L5-S1: There is mild broad-based posterior bulging the disc along with severe bilateral facet arthropathy which  results in mild spinal canal narrowing along with severe left and moderate right neuroforaminal narrowing.       Labs:  BMP  Lab Results   Component Value Date     01/13/2017    K 4.7 01/13/2017     01/13/2017    CO2 28 01/13/2017    BUN 19 01/13/2017    CREATININE 0.8 01/13/2017    CALCIUM 9.1 01/13/2017    ANIONGAP 8 01/13/2017    ESTGFRAFRICA >60.0 01/13/2017    EGFRNONAA >60.0 01/13/2017     Lab Results   Component Value Date    ALT 8 (L) 01/13/2017    AST 17 01/13/2017    ALKPHOS 90 01/13/2017    BILITOT 0.5 01/13/2017       Assessment:  Problem List Items Addressed This Visit     Morbid obesity due to excess calories    DDD (degenerative disc disease), lumbar    Chronic bilateral low back pain with left-sided sciatica - Primary    History of lumbar fusion    Lumbar spondylosis          This is a very pleasant and delightful 68-year-old female with active medical problems consisting of diffuse arthritis, GERD, obesity, and tobacco dependence who presents complaining of an episode of radicular low back pain that has now resolved.  The symptoms that she describes are consistent with lumbar radiculopathy and given her history of lumbar fusion at L3-4 it is most likely that she is experiencing adjacent level disease.  Review of her MRI supports this as there is multilevel degenerative disease including degenerative disc disease and facet arthropathy.  Hardware in the lumbar spine appears appropriately placed and does not appear to have malfunction at this time. She is currently free of any radicular symptoms and reports pain as being no worse than 1/10 in the past several weeks.  I spent a significant amount of time counseling her on the importance of continued home exercise based on training in physical therapy.  I encouraged her to engage in 30 min of cardiovascular exercise daily toward facilitating weight loss.  She appears highly motivated.    : Reviewed and consistent with medication use as  prescribed.    Treatment Plan:   Procedures: None  PT/OT/HEP: I encouraged the patient to start a home exercise regimen that includes daily, moderate cardiovascular exercise lasting at least 30 minutes.  This may include yoga, michael chi, walking, swimming, aqua aerobics, or other exercises that maintain a heart rate of 50-70% of the calculated maximum heart rate.  I also encouraged light, daily stretching focused on the target area.  Medications: No changes recommended  Labs: reviewed and medications are appropriately dosed for current hepatorenal function.  Imaging: No additional recommended at this time.    Follow Up: RTC prn    Angel Maradiaga Jr, MD  Interventional Pain Medicine / Anesthesiology    Disclaimer: This note was partly generated using dictation software which may occasionally result in transcription errors.

## 2018-08-21 NOTE — PROGRESS NOTES
Physical Therapy Daily Note     Name: Dari Schultztt  Clinic Number: [id number]  Diagnosis:   Encounter Diagnoses   Name Primary?    Chronic bilateral low back pain with left-sided sciatica Yes    Muscle weakness      Physician: Charity Fairchild PA-C  Treatment Orders: PT Eval and Treat  Past Medical History:   Diagnosis Date    Arthritis     Bronchitis     Cancer     history of basal skin cancer status post excision    GERD (gastroesophageal reflux disease)     Obesity     Raynaud's syndrome     Retinal migraine     Tobacco dependence      Precautions: OA knees, lumbar fusion L3-4 about 7 years ago  Evaluation date: 7/31/2018  Visit # authorized: 6/20  Authorization period: 12-31-18  Plan of care expiration: 9-25-18  MD Follow up appt: none scheduled.  Dr. Maradiaga 8-22-18    Subjective     Pt reports: feeling good and used push/pull over the weekend without difficulty and helped symptoms Pt states she noticed some pain in R arm over weekend.  Pt states she only has a little pain presently.    Pain Scale: before treatment: 0 currently; after push/pull 0/10after treatment: 0    Objective     Slight AR R pelvis  Pt points to distal biceps region of R arm as pain.  NO pain with resistance to biceps and no pain in arm after exercise    TREATMENT  Therapeutic exercise: Dari received therapeutic exercises to develop strength, endurance, ROM, flexibility and core stabilization for 55 minutes including:   After push/pull pt states she did note that she had a little pain that did not realize  Pelvic tilt x 20  Bridge x 20  Piriformis stretch x 5, watched use of R UE to avoid strain of biceps and modified placement of hands and pt felt more comfortable doing only 5 reps today  SLR x 20  Trunk rotation supine x 20  (NP)Clam  x 20   Hip abd/add supine x 10 will resume and hold clam due to not as good recruitment with sidelying clam  Hip abd standing x 2/10  Heel raise x 2/10  Sit-stand from chair + airex pad x 10  (cues to avoid valgus)  Hip ext standing leaning over counter x 20  Modified push/pull    Verbal and tactile cueing provided for proper performance and recruitment.         Written Home Exercises Provided: none provided today for pt still has hip abd supine from previous HEP  Pt demo good understanding of the education provided. Dari demonstrated good return demonstration of activities.     Pt. education:  · Posture reeducation, body mechanics, HEP,   · No spiritual or educational barriers to learning provided  · Pt has no cultural, educational or language barriers to learning provided.    Assessment   Modified piriformis stretch slightly and cut back reps for this was only ex that she might have strained biceps slightly.  Pt not getting great glut med contraction with clam so changed back to supine hip abd and better recruitment noted.Pt with improved symptoms overall and when feeling pain able to use push/pull effectively No arm pain at end so will monitor   Pt will continue to benefit from skilled outpatient physical therapy to address the remaining functional deficits, provide pt/family education, and to maximize pt's level of independence in the home and community environment. .     GOALS:   Short Term Goals:  4 weeks  Increase range of motion 25%  Increase strength 1/2 muscle grade  Improve postural awareness of pelvis to independently identify dysfunction with min assist from PT  Be able to perform HEP with minimal cueing required  Improve functional impairment of changing and maintaining body position to 50     Long Term Goals: 8 weeks  Increase range of motion to 75% to 100% full   Improve muscle strength 1 muscle grade  Improve and stabilize proper pelvic positioning  Walking for ADL and exercise will be restored without increased pain  Restore ability to stand for ADL without increased pain  Restore ability to sit for ADL without increased pain  Restore ability to arise in AM without increased  pain  Restore ability to perform ADL's and household activities independently and without increased pain  Improve functional impairment of changing and maintaining body position to CJ at least 20%, but < 40% impaired, limited or restricted     Anticipated barriers to physical therapy: none  Pt's spiritual, cultural and educational needs considered and pt agreeable to plan of care and goals      Plan   Continue with established Plan of Care towards PT goals.    Edilma Asif, PT

## 2018-08-22 ENCOUNTER — OFFICE VISIT (OUTPATIENT)
Dept: PAIN MEDICINE | Facility: CLINIC | Age: 68
End: 2018-08-22
Payer: MEDICARE

## 2018-08-22 VITALS
DIASTOLIC BLOOD PRESSURE: 71 MMHG | HEIGHT: 65 IN | BODY MASS INDEX: 36.03 KG/M2 | HEART RATE: 87 BPM | WEIGHT: 216.25 LBS | SYSTOLIC BLOOD PRESSURE: 129 MMHG

## 2018-08-22 DIAGNOSIS — G89.29 CHRONIC BILATERAL LOW BACK PAIN WITH LEFT-SIDED SCIATICA: Primary | ICD-10-CM

## 2018-08-22 DIAGNOSIS — Z98.1 HISTORY OF LUMBAR FUSION: ICD-10-CM

## 2018-08-22 DIAGNOSIS — M47.816 LUMBAR SPONDYLOSIS: ICD-10-CM

## 2018-08-22 DIAGNOSIS — M54.42 CHRONIC BILATERAL LOW BACK PAIN WITH LEFT-SIDED SCIATICA: Primary | ICD-10-CM

## 2018-08-22 DIAGNOSIS — E66.01 MORBID OBESITY DUE TO EXCESS CALORIES: ICD-10-CM

## 2018-08-22 DIAGNOSIS — M51.36 DDD (DEGENERATIVE DISC DISEASE), LUMBAR: ICD-10-CM

## 2018-08-22 PROCEDURE — 99204 OFFICE O/P NEW MOD 45 MIN: CPT | Mod: S$GLB,,, | Performed by: PAIN MEDICINE

## 2018-08-22 PROCEDURE — 99499 UNLISTED E&M SERVICE: CPT | Mod: S$GLB,,, | Performed by: PAIN MEDICINE

## 2018-08-22 PROCEDURE — 99999 PR PBB SHADOW E&M-EST. PATIENT-LVL III: CPT | Mod: PBBFAC,,, | Performed by: PAIN MEDICINE

## 2018-08-22 NOTE — LETTER
August 22, 2018      Charity Fairchild PA-C  [address] [facility] [address]  [address]  [facility] [address]           [facility]  [address] [facility] [address] [place] [address]  Phone: [phone]  Fax: [phone]          Patient: Dari Polanco   MR Number: [id number]   YOB: 1950   Date of Visit: 8/22/2018       Dear Charity Fairchild:    Thank you for referring Dari Polanco to me for evaluation. Attached you will find relevant portions of my assessment and plan of care.    If you have questions, please do not hesitate to call me. I look forward to following Dari Polanco along with you.    Sincerely,    Angel Maradiaga Jr., MD    Enclosure  CC:  No Recipients    If you would like to receive this communication electronically, please contact [email or web address] or [phone] to request more information on [employer] access.    For providers and/or their staff who would like to refer a patient to [facility], please contact us through our one-stop-shop provider referral line, [facility], at [phone].    If you feel you have received this communication in error or would no longer like to receive these types of communications, please e-mail [email or web address]

## 2018-08-28 ENCOUNTER — CLINICAL SUPPORT (OUTPATIENT)
Dept: REHABILITATION | Facility: HOSPITAL | Age: 68
End: 2018-08-28
Payer: MEDICARE

## 2018-08-28 DIAGNOSIS — M54.42 CHRONIC BILATERAL LOW BACK PAIN WITH LEFT-SIDED SCIATICA: Primary | ICD-10-CM

## 2018-08-28 DIAGNOSIS — M62.81 MUSCLE WEAKNESS: ICD-10-CM

## 2018-08-28 DIAGNOSIS — G89.29 CHRONIC BILATERAL LOW BACK PAIN WITH LEFT-SIDED SCIATICA: Primary | ICD-10-CM

## 2018-08-28 PROCEDURE — 97110 THERAPEUTIC EXERCISES: CPT | Mod: PN | Performed by: PHYSICAL THERAPIST

## 2018-08-28 NOTE — PROGRESS NOTES
Physical Therapy Daily Note     Name: Dari Polanco  Clinic Number: [id number]  Diagnosis:   Encounter Diagnoses   Name Primary?    Chronic bilateral low back pain with left-sided sciatica Yes    Muscle weakness      Physician: Charity Fairchild PA-C  Treatment Orders: PT Eval and Treat  Past Medical History:   Diagnosis Date    Arthritis     Bronchitis     Cancer     history of basal skin cancer status post excision    GERD (gastroesophageal reflux disease)     Obesity     Raynaud's syndrome     Retinal migraine     Tobacco dependence      Precautions: OA knees, lumbar fusion L3-4 about 7 years ago  Evaluation date: 7/31/2018  Visit # authorized: 7/20  Authorization period: 12-31-18  Plan of care expiration: 9-25-18  MD Follow up appt: none scheduled.  Dr. Maradiaga 8-22-18    Subjective     Pt reports: still doing really well and feels level with no pain Arm is ok.   Pain Scale: before treatment: 0 currently; after push/pull 0/10after treatment: 0    Objective       Level pelvis   No pain in arm  NO pain with resistance to biceps and no pain in arm after exercise    TREATMENT  Therapeutic exercise: Dari received therapeutic exercises to develop strength, endurance, ROM, flexibility and core stabilization for 25 minutes including:     Pelvic tilt x 20  Bridge x 20  Piriformis stretch x 10 able to perform without difficulty  SLR x 20  Trunk rotation supine x 20  Hip abd/add supine x 2/10   Hip abd standing x 2/10  Heel raise x 2/10  Sit-stand from chair + airex pad x 10 (cues to avoid valgus)  Hip ext standing leaning over counter x 20  Modified push/pull    Verbal and tactile cueing provided for proper performance and recruitment.         Written Home Exercises Provided: none today  Pt demo good understanding of the education provided. Dari demonstrated good return demonstration of activities.     Pt. education:  · Posture reeducation, body mechanics, HEP,   · No spiritual or educational barriers to  learning provided  · Pt has no cultural, educational or language barriers to learning provided.    Assessment   Pt with level pelvis to start.  Doing better with therex, but still requires some cueing for proper performance and recruitment   Pt will continue to benefit from skilled outpatient physical therapy to address the remaining functional deficits, provide pt/family education, and to maximize pt's level of independence in the home and community environment. .     GOALS:   Short Term Goals:  4 weeks  Increase range of motion 25%  Increase strength 1/2 muscle grade  Improve postural awareness of pelvis to independently identify dysfunction with min assist from PT  Be able to perform HEP with minimal cueing required  Improve functional impairment of changing and maintaining body position to 50     Long Term Goals: 8 weeks  Increase range of motion to 75% to 100% full   Improve muscle strength 1 muscle grade  Improve and stabilize proper pelvic positioning  Walking for ADL and exercise will be restored without increased pain  Restore ability to stand for ADL without increased pain  Restore ability to sit for ADL without increased pain  Restore ability to arise in AM without increased pain  Restore ability to perform ADL's and household activities independently and without increased pain  Improve functional impairment of changing and maintaining body position to CJ at least 20%, but < 40% impaired, limited or restricted     Anticipated barriers to physical therapy: none  Pt's spiritual, cultural and educational needs considered and pt agreeable to plan of care and goals      Plan   Continue with established Plan of Care towards PT goals.    Edilma Asif, PT

## 2018-08-30 ENCOUNTER — CLINICAL SUPPORT (OUTPATIENT)
Dept: REHABILITATION | Facility: HOSPITAL | Age: 68
End: 2018-08-30
Payer: MEDICARE

## 2018-08-30 DIAGNOSIS — G89.29 CHRONIC BILATERAL LOW BACK PAIN WITH LEFT-SIDED SCIATICA: Primary | ICD-10-CM

## 2018-08-30 DIAGNOSIS — M62.81 MUSCLE WEAKNESS: ICD-10-CM

## 2018-08-30 DIAGNOSIS — M54.42 CHRONIC BILATERAL LOW BACK PAIN WITH LEFT-SIDED SCIATICA: Primary | ICD-10-CM

## 2018-08-30 PROCEDURE — 97110 THERAPEUTIC EXERCISES: CPT | Mod: PN | Performed by: PHYSICAL THERAPIST

## 2018-08-30 NOTE — PROGRESS NOTES
Physical Therapy Daily Note     Name: Dari Polanco  Clinic Number: [id number]  Diagnosis:   Encounter Diagnoses   Name Primary?    Chronic bilateral low back pain with left-sided sciatica Yes    Muscle weakness      Physician: Charity Fairchild PA-C  Treatment Orders: PT Eval and Treat  Past Medical History:   Diagnosis Date    Arthritis     Bronchitis     Cancer     history of basal skin cancer status post excision    GERD (gastroesophageal reflux disease)     Obesity     Raynaud's syndrome     Retinal migraine     Tobacco dependence      Precautions: OA knees, lumbar fusion L3-4 about 7 years ago  Evaluation date: 7/31/2018  Visit # authorized: 8/20  Authorization period: 12-31-18  Plan of care expiration: 9-25-18  MD Follow up appt: none scheduled.  Dr. Maradiaga 8-22-18    Subjective     Pt reports:   Pain Scale: before treatment: 0 currently; after push/pull 0/10after treatment: 0    Objective       Level pelvis   No pain in arm  NO pain with resistance to biceps and no pain in arm after exercise    PT reviewed FOTO scores for Dari Polanco on 8-30-18  FOTO score: 65 IE 41  FOTO scores were entered into [employer] - see media section.      TREATMENT  Therapeutic exercise: Dari received therapeutic exercises to develop strength, endurance, ROM, flexibility and core stabilization for 25 minutes including:     Pelvic tilt x 20  Bridge x 20  Piriformis stretch x 10 able to perform without difficulty  SLR x 20 with 1#  Trunk rotation supine x 20  Hip abd/add supine x 2/10   Hip abd standing x 2/10  Heel raise x 2/10  Sit-stand from chair + airex pad x 10 (cues to avoid valgus)  Hip ext standing leaning over counter x 20  Modified push/pull    Verbal and tactile cueing provided for proper performance and recruitment.         Written Home Exercises Provided: none today  Pt demo good understanding of the education provided. Dari demonstrated good return demonstration of activities.     Pt.  education:  · Posture reeducation, body mechanics, HEP,   · No spiritual or educational barriers to learning provided  · Pt has no cultural, educational or language barriers to learning provided.    Assessment   Improved FOTO Pt with level pelvis to start.  Doing better with therex, but still requires some cueing for proper performance and recruitment, but did better than last visit.  Ready to start to wean from PT and will decrease to 1 time a week for 2 weeks and if doing well with then discharge   Pt will continue to benefit from skilled outpatient physical therapy to address the remaining functional deficits, provide pt/family education, and to maximize pt's level of independence in the home and community environment. .     GOALS:   Short Term Goals:  4 weeks  Increase range of motion 25%  Increase strength 1/2 muscle grade  Improve postural awareness of pelvis to independently identify dysfunction with min assist from PT  Be able to perform HEP with minimal cueing required  Improve functional impairment of changing and maintaining body position to 50     Long Term Goals: 8 weeks  Increase range of motion to 75% to 100% full   Improve muscle strength 1 muscle grade  Improve and stabilize proper pelvic positioning  Walking for ADL and exercise will be restored without increased pain  Restore ability to stand for ADL without increased pain  Restore ability to sit for ADL without increased pain  Restore ability to arise in AM without increased pain  Restore ability to perform ADL's and household activities independently and without increased pain  Improve functional impairment of changing and maintaining body position to CJ at least 20%, but < 40% impaired, limited or restricted     Anticipated barriers to physical therapy: none  Pt's spiritual, cultural and educational needs considered and pt agreeable to plan of care and goals      Plan   Continue with established Plan of Care towards PT goals.    Edilma DUEÑAS  Laya, PT

## 2018-09-11 ENCOUNTER — CLINICAL SUPPORT (OUTPATIENT)
Dept: REHABILITATION | Facility: HOSPITAL | Age: 68
End: 2018-09-11
Payer: MEDICARE

## 2018-09-11 DIAGNOSIS — M54.42 CHRONIC BILATERAL LOW BACK PAIN WITH LEFT-SIDED SCIATICA: Primary | ICD-10-CM

## 2018-09-11 DIAGNOSIS — M62.81 MUSCLE WEAKNESS: ICD-10-CM

## 2018-09-11 DIAGNOSIS — G89.29 CHRONIC BILATERAL LOW BACK PAIN WITH LEFT-SIDED SCIATICA: Primary | ICD-10-CM

## 2018-09-11 PROCEDURE — 97110 THERAPEUTIC EXERCISES: CPT | Mod: PN | Performed by: PHYSICAL THERAPIST

## 2018-09-11 NOTE — PROGRESS NOTES
"Physical Therapy Daily Note     Name: Dari Polanco  Clinic Number: [id number]  Diagnosis:   Encounter Diagnoses   Name Primary?    Chronic bilateral low back pain with left-sided sciatica Yes    Muscle weakness      Physician: Charity Fairchild PA-C  Treatment Orders: PT Eval and Treat  Past Medical History:   Diagnosis Date    Arthritis     Bronchitis     Cancer     history of basal skin cancer status post excision    GERD (gastroesophageal reflux disease)     Obesity     Raynaud's syndrome     Retinal migraine     Tobacco dependence      Precautions: OA knees, lumbar fusion L3-4 about 7 years ago  Evaluation date: 7/31/2018  Visit # authorized: 9/20  Authorization period: 12-31-18  Plan of care expiration: 9-25-18  MD Follow up appt: none scheduled.  Dr. Maradiaga 8-22-18    Subjective     Pt reports: she had to bend over and pick her dog up when some big dogs ran up to her and she had to work on keeping dogs off her.  Pt states not comfortable today  Pain Scale: before treatment: 5 currently; after push/pull 0/10 after treatment: 0    Objective     Lumbar active range of motion in standing is: 9-11-18  - flexion - toes                     - extension -  50%                         - left side bending -  1" above knee         - right side bending -  1" above knee           Lumbar active range of motion in standing is: initial eval   - flexion - toes                      - extension -  10%                         - left side bending -  2" above knee pain         - right side bending -  2" above knee          Pelvic positioning: OK L, continues with dysfunction able to self correct     Flexibility testing:  - hamstrings:     90/90 test R 15 L 15 at IE R 20 L 30           - gastrocnemius:   DF ankle R 5 degrees L 5 degrees    Muscle Strength 9-11-18  MMT R L   Hip flexion 4/5 4/5   Hip abduction 4/5 4/5   Hip extension bridge 75%     Knee extension 5/5 5/5   Knee flexion 4+/5 4+/5            Muscle Strength " initial eval  MMT R L   Hip flexion 3+/5 3+/5   Hip abduction 3-/5 3-/5   Bridge 25%       Knee extension 4/5 4/5   Knee flexion 4/5 4/5   Ankle dorsiflexion 5/5 5/5         Endurance is good poor.     Palpation: no tenderness noted at IE min tenderness to lumbar region, mod in L SI region        Outcome measures:   Impairment: changing and maintaining body position  FOTO lumbar disability index: 65 at IE 41  PT reviewed FOTO scores for Dari Polanco   FOTO scores were entered into [employer] - see media section.     § G-code current: CJ at least 20%, but < 40% impaired, limited or restricted  § G-code goal: CJ at least 20%, but < 40% impaired, limited or restricted  · Severity modifier selections based on the FOTO scoring, objective findings, and co-morbidity assessment      PT reviewed FOTO scores for Dari Polanco on 8-30-18  FOTO score: 65 IE 41  FOTO scores were entered into [employer] - see media section.      TREATMENT  Therapeutic exercise: Dari received therapeutic exercises to develop strength, endurance, ROM, flexibility and core stabilization for 55 minutes including:     Pelvic tilt x 20  Bridge x 20  Piriformis stretch x 10 able to perform without difficulty  SLR x 10 with 0# stopped today due to feeling soreness   Trunk rotation supine x 20  Hip abd/add supine x 2/10   Hip abd standing x 2/10  Heel raise x 2/10  Sit-stand from chair + airex pad x 10 (cues to avoid valgus)  Hip ext standing leaning over counter x 20  Modified push/pull    Verbal and tactile cueing provided for proper performance and recruitment.         Written Home Exercises Provided: none today  Pt demo good understanding of the education provided. Dari demonstrated good return demonstration of activities.     Pt. education:  · Posture reeducation, body mechanics, HEP,   · No spiritual or educational barriers to learning provided  · Pt has no cultural, educational or language barriers to learning provided.    Assessment   Patient  demonstrates improvement in range of motion, strength, stabilization and function.    Patient appears independent in the prescribed HEP and ready for discharge after fully achieving the established goals.       Pt will continue to benefit from skilled outpatient physical therapy to address the remaining functional deficits, provide pt/family education, and to maximize pt's level of independence in the home and community environment. .     GOALS:   Short Term Goals:  4 weeks MET STG's  Increase range of motion 25%  Increase strength 1/2 muscle grade  Improve postural awareness of pelvis to independently identify dysfunction with min assist from PT  Be able to perform HEP with minimal cueing required  Improve functional impairment of changing and maintaining body position to 50     Long Term Goals: 8 weeks MET LTG's  Increase range of motion to 75% to 100% full   Improve muscle strength 1 muscle grade  Improve and stabilize proper pelvic positioning  Walking for ADL and exercise will be restored without increased pain  Restore ability to stand for ADL without increased pain  Restore ability to sit for ADL without increased pain  Restore ability to arise in AM without increased pain  Restore ability to perform ADL's and household activities independently and without increased pain  Improve functional impairment of changing and maintaining body position to CJ at least 20%, but < 40% impaired, limited or restricted       Plan   Due to recent injury we will follow up with pt for 1 more visit for final assessment     Edilma Asif, PT

## 2018-09-18 ENCOUNTER — CLINICAL SUPPORT (OUTPATIENT)
Dept: REHABILITATION | Facility: HOSPITAL | Age: 68
End: 2018-09-18
Payer: MEDICARE

## 2018-09-18 DIAGNOSIS — M62.81 MUSCLE WEAKNESS: ICD-10-CM

## 2018-09-18 DIAGNOSIS — M54.42 CHRONIC BILATERAL LOW BACK PAIN WITH LEFT-SIDED SCIATICA: Primary | ICD-10-CM

## 2018-09-18 DIAGNOSIS — G89.29 CHRONIC BILATERAL LOW BACK PAIN WITH LEFT-SIDED SCIATICA: Primary | ICD-10-CM

## 2018-09-18 PROCEDURE — 97110 THERAPEUTIC EXERCISES: CPT | Mod: PN | Performed by: PHYSICAL THERAPIST

## 2018-09-18 PROCEDURE — G8983 BODY POS D/C STATUS: HCPCS | Mod: CJ,PN | Performed by: PHYSICAL THERAPIST

## 2018-09-18 PROCEDURE — G8982 BODY POS GOAL STATUS: HCPCS | Mod: CJ,PN | Performed by: PHYSICAL THERAPIST

## 2018-09-18 NOTE — PROGRESS NOTES
"Physical Therapy Discharge Note     Name: Dari Polanco  Clinic Number: [id number]  Diagnosis:   Encounter Diagnoses   Name Primary?    Chronic bilateral low back pain with left-sided sciatica Yes    Muscle weakness      Physician: Sam Teran MD  Treatment Orders: PT Eval and Treat  Past Medical History:   Diagnosis Date    Arthritis     Bronchitis     Cancer     history of basal skin cancer status post excision    GERD (gastroesophageal reflux disease)     Obesity     Raynaud's syndrome     Retinal migraine     Tobacco dependence      Precautions: OA knees, lumbar fusion L3-4 about 7 years ago  Evaluation date: 7/31/2018  Visit # authorized: 10/20  Authorization period: 12-31-18  Plan of care expiration: 9-25-18  MD Follow up appt: none scheduled.  Dr. Maradiaga 8-22-18    Subjective     Pt reports: she is feeling better and feels recovered from incident with dog.    Pain Scale: before treatment: 5 currently; after push/pull 0/10 after treatment: 0    Objective     Lumbar active range of motion in standing is: 9-18-18  - flexion - toes                     - extension -  50%                         - left side bending -  1" above knee         - right side bending -  1" above knee           Lumbar active range of motion in standing is: initial eval   - flexion - toes                      - extension -  10%                         - left side bending -  2" above knee pain         - right side bending -  2" above knee          Pelvic positioning: ND L, continues with dysfunction able to self correct     Flexibility testing:  - hamstrings:     90/90 test R 15 L 15 at IE R 20 L 30           - gastrocnemius:   DF ankle R 5 degrees L 5 degrees        Muscle Strength 9-18-18  MMT R L   Hip flexion 4+/5 4/5   Hip abduction 4/5 4/5   Hip extension bridge 100%     Knee extension 5/5 5/5   Knee flexion 5/5 5/5            Muscle Strength initial eval  MMT R L   Hip flexion 3+/5 3+/5   Hip abduction 3-/5 3-/5 "   Bridge 25%       Knee extension 4/5 4/5   Knee flexion 4/5 4/5   Ankle dorsiflexion 5/5 5/5         Endurance is good at IE  poor.     Palpation: no tenderness noted at IE min tenderness to lumbar region, mod in L SI region        Outcome measures:   Impairment: changing and maintaining body position  FOTO lumbar disability index: 70 at IE 41  PT reviewed FOTO scores for Dari Polanco   FOTO scores were entered into [employer] - see media section.     § G-code discharge: CJ at least 20%, but < 40% impaired, limited or restricted  § G-code goal: CJ at least 20%, but < 40% impaired, limited or restricted  · Severity modifier selections based on the FOTO scoring, objective findings, and co-morbidity assessment    TREATMENT  Therapeutic exercise: Dari received therapeutic exercises to develop strength, endurance, ROM, flexibility and core stabilization for 45 minutes including:     Pelvic tilt x 20  Bridge x 20  Piriformis stretch x 10 able to perform without difficulty  SLR x 10 with 0# stopped today due to feeling soreness   Trunk rotation supine x 20  (NP)Hip abd/add supine x 2/10     Hip abd standing x 2/10  Heel raise x 2/10  Sit-stand from chair + airex pad x 10 (cues to avoid valgus)  Hip ext standing leaning over counter x 20  Modified push/pull    Pt reported that she noticed some difficulty at times with lifting R leg to get dressed.  Instructed pt to do modified push/pull prior ot getting dressed and see if that will work to improve ability to dress.     Verbal and tactile cueing provided for proper performance and recruitment.      Written Home Exercises Provided: none today  Pt demo good understanding of the education provided. Dari demonstrated good return demonstration of activities.     Pt. education:  · Posture reeducation, body mechanics, HEP,   · No spiritual or educational barriers to learning provided  · Pt has no cultural, educational or language barriers to learning provided.    Assessment    Patient demonstrates improvement in range of motion, strength, stabilization and function.    Patient appears independent in the prescribed HEP and ready for discharge after fully achieving the established goals.        GOALS:   Short Term Goals:  4 weeks MET STG's  Increase range of motion 25%  Increase strength 1/2 muscle grade  Improve postural awareness of pelvis to independently identify dysfunction with min assist from PT  Be able to perform HEP with minimal cueing required  Improve functional impairment of changing and maintaining body position to 50     Long Term Goals: 8 weeks MET LTG's  Increase range of motion to 75% to 100% full   Improve muscle strength 1 muscle grade  Improve and stabilize proper pelvic positioning  Walking for ADL and exercise will be restored without increased pain  Restore ability to stand for ADL without increased pain  Restore ability to sit for ADL without increased pain  Restore ability to arise in AM without increased pain  Restore ability to perform ADL's and household activities independently and without increased pain  Improve functional impairment of changing and maintaining body position to CJ at least 20%, but < 40% impaired, limited or restricted       Plan   Patient is discharged from physical therapy after fully achieving the established goals.  Thank you for allowing us to assist in the care of your patient.    Edilma Asif, PT

## 2018-11-20 DIAGNOSIS — M50.30 DDD (DEGENERATIVE DISC DISEASE), CERVICAL: ICD-10-CM

## 2018-11-20 DIAGNOSIS — M51.36 DDD (DEGENERATIVE DISC DISEASE), LUMBAR: ICD-10-CM

## 2018-11-20 DIAGNOSIS — I73.00 RAYNAUD'S DISEASE WITHOUT GANGRENE: ICD-10-CM

## 2018-11-26 RX ORDER — NIFEDIPINE 30 MG/1
TABLET, FILM COATED, EXTENDED RELEASE ORAL
Qty: 90 TABLET | Refills: 2 | Status: SHIPPED | OUTPATIENT
Start: 2018-11-26 | End: 2020-01-01 | Stop reason: SDUPTHER

## 2018-11-26 RX ORDER — MELOXICAM 7.5 MG/1
TABLET ORAL
Qty: 90 TABLET | Refills: 2 | Status: SHIPPED | OUTPATIENT
Start: 2018-11-26 | End: 2020-01-01 | Stop reason: SDUPTHER

## 2019-03-13 ENCOUNTER — OFFICE VISIT (OUTPATIENT)
Dept: FAMILY MEDICINE | Facility: CLINIC | Age: 69
End: 2019-03-13
Payer: MEDICARE

## 2019-03-13 VITALS
HEIGHT: 65 IN | SYSTOLIC BLOOD PRESSURE: 132 MMHG | DIASTOLIC BLOOD PRESSURE: 82 MMHG | OXYGEN SATURATION: 99 % | HEART RATE: 76 BPM | BODY MASS INDEX: 36.07 KG/M2 | WEIGHT: 216.5 LBS

## 2019-03-13 DIAGNOSIS — K21.9 GASTROESOPHAGEAL REFLUX DISEASE WITHOUT ESOPHAGITIS: ICD-10-CM

## 2019-03-13 DIAGNOSIS — Z78.0 POSTMENOPAUSAL: ICD-10-CM

## 2019-03-13 DIAGNOSIS — M21.612 BILATERAL BUNIONS: ICD-10-CM

## 2019-03-13 DIAGNOSIS — Z00.00 ENCOUNTER FOR PREVENTIVE HEALTH EXAMINATION: Primary | ICD-10-CM

## 2019-03-13 DIAGNOSIS — E66.01 MORBID OBESITY DUE TO EXCESS CALORIES: ICD-10-CM

## 2019-03-13 DIAGNOSIS — Z12.39 SCREENING FOR BREAST CANCER: ICD-10-CM

## 2019-03-13 DIAGNOSIS — M79.672 PAIN IN BOTH FEET: ICD-10-CM

## 2019-03-13 DIAGNOSIS — M21.611 BILATERAL BUNIONS: ICD-10-CM

## 2019-03-13 DIAGNOSIS — Z72.0 TOBACCO USE: ICD-10-CM

## 2019-03-13 DIAGNOSIS — M79.671 PAIN IN BOTH FEET: ICD-10-CM

## 2019-03-13 DIAGNOSIS — Z13.6 ENCOUNTER FOR SCREENING FOR CARDIOVASCULAR DISORDERS: ICD-10-CM

## 2019-03-13 DIAGNOSIS — R53.83 FATIGUE, UNSPECIFIED TYPE: ICD-10-CM

## 2019-03-13 DIAGNOSIS — B35.1 ONYCHOMYCOSIS: ICD-10-CM

## 2019-03-13 PROCEDURE — G0439 PR MEDICARE ANNUAL WELLNESS SUBSEQUENT VISIT: ICD-10-PCS | Mod: HCNC,S$GLB,, | Performed by: NURSE PRACTITIONER

## 2019-03-13 PROCEDURE — 99999 PR PBB SHADOW E&M-EST. PATIENT-LVL V: CPT | Mod: PBBFAC,HCNC,, | Performed by: NURSE PRACTITIONER

## 2019-03-13 PROCEDURE — 99999 PR PBB SHADOW E&M-EST. PATIENT-LVL V: ICD-10-PCS | Mod: PBBFAC,HCNC,, | Performed by: NURSE PRACTITIONER

## 2019-03-13 PROCEDURE — G0439 PPPS, SUBSEQ VISIT: HCPCS | Mod: HCNC,S$GLB,, | Performed by: NURSE PRACTITIONER

## 2019-03-13 NOTE — PROGRESS NOTES
"Dari Polanco presented for a  Medicare AWV and comprehensive Health Risk Assessment today. The following components were reviewed and updated:    · Medical history  · Family History  · Social history  · Allergies and Current Medications  · Health Risk Assessment  · Health Maintenance  · Care Team     ** See Completed Assessments for Annual Wellness Visit within the encounter summary.**     The following assessments were completed:  · Living Situation  · CAGE  · Depression Screening  · Timed Get Up and Go  · Whisper Test  · Cognitive Function Screening       · Nutrition Screening  · ADL Screening  · PAQ Screening    Vitals:    03/13/19 0848   BP: 132/82   BP Location: Left arm   Patient Position: Sitting   BP Method: Large (Manual)   Pulse: 76   SpO2: 99%   Weight: 98.2 kg (216 lb 7.9 oz)   Height: 5' 5" (1.651 m)     Body mass index is 36.03 kg/m².  Physical Exam   Constitutional: She is oriented to person, place, and time. She appears well-nourished.   Cardiovascular: Normal rate, regular rhythm, normal heart sounds and intact distal pulses.   Pulmonary/Chest: Effort normal and breath sounds normal.   Neurological: She is alert and oriented to person, place, and time.   Skin: Skin is warm and dry.   Vitals reviewed.        Diagnoses and health risks identified today and associated recommendations/orders:    1. Encounter for preventive health examination  Reviewed and discussed health maintenance.    - Mammo Digital Screening Bilateral With CAD; Future  - DXA Bone Density Spine And Hip; Future  - Comprehensive metabolic panel; Future  - TSH; Future  - Lipid panel; Future    2. Encounter for screening for cardiovascular disorders  - Comprehensive metabolic panel; Future  - Lipid panel; Future    3. Postmenopausal  - DXA Bone Density Spine And Hip; Future    4. Screening for breast cancer  - Mammo Digital Screening Bilateral With CAD; Future    5. Gastroesophageal reflux disease without esophagitis  Stable- continue " current treatment and follow up routinely with PCP     6. Tobacco use  Not willing to quit    7. Morbid obesity due to excess calories  Encouraged healthy eating, weight loss and routine exercise     8. Fatigue, unspecified type  - Comprehensive metabolic panel; Future  - TSH; Future    9. Bilateral bunions  - Ambulatory referral to Podiatry    10. Onychomycosis  - Ambulatory referral to Podiatry    11. Pain in both feet  - Ambulatory referral to Podiatry    Provided Dari with a 5-10 year written screening schedule and personal prevention plan. Recommendations were developed using the USPSTF age appropriate recommendations. Education, counseling, and referrals were provided as needed. After Visit Summary printed and given to patient which includes a list of additional screenings\tests needed.    Nandini Cristina, NP

## 2019-03-13 NOTE — PATIENT INSTRUCTIONS
Counseling and Referral of Other Preventative  (Italic type indicates deductible and co-insurance are waived)    Patient Name: Dari Polanco  Today's Date: 3/13/2019    Health Maintenance       Date Due Completion Date    Mammogram 03/27/2018 3/27/2017    Override on 6/3/2013: Done    Influenza Vaccine 08/01/2018 9/27/2017    Override on 10/23/2015: Done    DEXA SCAN 10/23/2018 10/23/2015    Override on 5/16/2013: (N/S)    Colonoscopy 12/16/2020 12/16/2013    Lipid Panel 01/13/2022 1/13/2017    TETANUS VACCINE 08/24/2027 8/24/2017        No orders of the defined types were placed in this encounter.    The following information is provided to all patients.  This information is to help you find resources for any of the problems found today that may be affecting your health:                Living healthy guide: www.[facility].[place].gov      Understanding Diabetes: www.diabetes.org      Eating healthy: www.cdc.gov/healthyweight      [facility] home safety checklist: www.cdc.gov/steadi/patient.html      Agency on Aging: www.goea.[place].[facility]      Alcoholics anonymous (AA): www.aa.org      Physical Activity: www.naomie.nih.gov/jv4ellf      Tobacco use: www.quitwithusla.org

## 2019-03-19 ENCOUNTER — OFFICE VISIT (OUTPATIENT)
Dept: PODIATRY | Facility: CLINIC | Age: 69
End: 2019-03-19
Payer: MEDICARE

## 2019-03-19 VITALS — HEIGHT: 65 IN | BODY MASS INDEX: 36.07 KG/M2 | WEIGHT: 216.5 LBS

## 2019-03-19 DIAGNOSIS — M20.11 VALGUS DEFORMITY OF BOTH GREAT TOES: ICD-10-CM

## 2019-03-19 DIAGNOSIS — B35.1 ONYCHOMYCOSIS DUE TO DERMATOPHYTE: ICD-10-CM

## 2019-03-19 DIAGNOSIS — M20.12 VALGUS DEFORMITY OF BOTH GREAT TOES: ICD-10-CM

## 2019-03-19 DIAGNOSIS — M20.41 HAMMER TOES OF BOTH FEET: ICD-10-CM

## 2019-03-19 DIAGNOSIS — M20.42 HAMMER TOES OF BOTH FEET: ICD-10-CM

## 2019-03-19 DIAGNOSIS — L84 CORN OR CALLUS: ICD-10-CM

## 2019-03-19 DIAGNOSIS — I73.9 PERIPHERAL VASCULAR DISEASE: Primary | ICD-10-CM

## 2019-03-19 PROCEDURE — 99999 PR PBB SHADOW E&M-EST. PATIENT-LVL III: ICD-10-PCS | Mod: PBBFAC,HCNC,, | Performed by: PODIATRIST

## 2019-03-19 PROCEDURE — 99999 PR PBB SHADOW E&M-EST. PATIENT-LVL III: CPT | Mod: PBBFAC,HCNC,, | Performed by: PODIATRIST

## 2019-03-19 PROCEDURE — 11056 PR TRIM BENIGN HYPERKERATOTIC SKIN LESION,2-4: ICD-10-PCS | Mod: Q9,HCNC,S$GLB, | Performed by: PODIATRIST

## 2019-03-19 PROCEDURE — 11056 PARNG/CUTG B9 HYPRKR LES 2-4: CPT | Mod: Q9,HCNC,S$GLB, | Performed by: PODIATRIST

## 2019-03-19 PROCEDURE — 1101F PT FALLS ASSESS-DOCD LE1/YR: CPT | Mod: HCNC,CPTII,S$GLB, | Performed by: PODIATRIST

## 2019-03-19 PROCEDURE — 99213 PR OFFICE/OUTPT VISIT, EST, LEVL III, 20-29 MIN: ICD-10-PCS | Mod: 25,HCNC,S$GLB, | Performed by: PODIATRIST

## 2019-03-19 PROCEDURE — 11721 DEBRIDE NAIL 6 OR MORE: CPT | Mod: Q9,59,HCNC,S$GLB | Performed by: PODIATRIST

## 2019-03-19 PROCEDURE — 99213 OFFICE O/P EST LOW 20 MIN: CPT | Mod: 25,HCNC,S$GLB, | Performed by: PODIATRIST

## 2019-03-19 PROCEDURE — 1101F PR PT FALLS ASSESS DOC 0-1 FALLS W/OUT INJ PAST YR: ICD-10-PCS | Mod: HCNC,CPTII,S$GLB, | Performed by: PODIATRIST

## 2019-03-19 PROCEDURE — 11721 PR DEBRIDEMENT OF NAILS, 6 OR MORE: ICD-10-PCS | Mod: Q9,59,HCNC,S$GLB | Performed by: PODIATRIST

## 2019-03-19 NOTE — LETTER
March 29, 2019      Nandini Cristina, JACLYN  [address] [facility] [address] [place] [address]           [facility]  [address] [facility] [address] [place] [address]  Phone: [phone]          Patient: Dari Polanco   MR Number: [id number]   YOB: 1950   Date of Visit: 3/19/2019       Dear Nandini Cristina:    Thank you for referring Dari Polanco to me for evaluation. Attached you will find relevant portions of my assessment and plan of care.    If you have questions, please do not hesitate to call me. I look forward to following Dari Polanco along with you.    Sincerely,    Jl Kaur  CC:  No Recipients    If you would like to receive this communication electronically, please contact [email or web address] or [phone] to request more information on [employer] Link access.    For providers and/or their staff who would like to refer a patient to [facility], please contact us through our one-stop-shop provider referral line, [facility], at [phone].    If you feel you have received this communication in error or would no longer like to receive these types of communications, please e-mail [email or web address]

## 2019-03-19 NOTE — PROGRESS NOTES
Subjective:      Patient ID: Dari Polanco is a 68 y.o. female.    Chief Complaint: Foot Problem (wm bunions;  hammertoe - rt 2nd); Callouses (left 2nd ); Nail Problem (fungus, long nails); and Other Misc (PCP:  Dr Teran (DALY Cristina NP) 3/13/19)    Dari is a 68 y.o. female who presents to the clinic for evaluation and treatment of high risk feet. Dari has a past medical history of Arthritis, Bronchitis, Cancer, GERD (gastroesophageal reflux disease), Obesity, Raynaud's syndrome, Retinal migraine, and Tobacco dependence. The patient's chief complaint is long, thick toenails and painful calluses. This patient has documented high risk feet requiring routine maintenance secondary to peripheral vascular disease.    PCP: Sma Teran MD    Date Last Seen by PCP: 3/13/19    Current shoe gear:   Casual shoes    Last encounter in this department: Visit date not found    No results found for: HGBA1C      Review of Systems   Constitution: Negative for chills and fever.   Cardiovascular: Positive for leg swelling. Negative for claudication.   Respiratory: Negative for shortness of breath.    Skin: Positive for nail changes and suspicious lesions. Negative for itching and rash.   Musculoskeletal: Positive for arthritis. Negative for muscle cramps, muscle weakness and myalgias.   Gastrointestinal: Negative for nausea and vomiting.   Neurological: Negative for focal weakness, loss of balance, numbness and paresthesias.           Objective:      Physical Exam   Constitutional: She is oriented to person, place, and time. She appears well-developed and well-nourished. No distress.   Cardiovascular:   Pulses:       Dorsalis pedis pulses are 1+ on the right side, and 1+ on the left side.        Posterior tibial pulses are 1+ on the right side, and 1+ on the left side.   < 3 sec capillary refill time to toes 1-5 bilateral. Feet are warm to touch proximally with distal cooling b/l. There is no hair growth on the feet and  toes b/l. There is moderate edema b/l with varicosities present b/l.      Musculoskeletal:   Medial 1st MTPJ exostosis bilateral. Lateral deviation of hallux, non trackbound. No pain w/ ROM to 1st or 2nd MTPJs.    Reducible extensor and flexor contractures at the MTPJ and PIPJ of toes 2-5, bilat.     Equinus noted b/l ankles with < 10 deg DF noted. MMT 5/5 in DF/PF/Inv/Ev resistance with no reproduction of pain in any direction. Passive range of motion of ankle and pedal joints is painless b/l.     Neurological: She is alert and oriented to person, place, and time. She has normal strength. She displays no atrophy and no tremor. No sensory deficit. She exhibits normal muscle tone.   Negative tinel sign bilateral.   Skin: Skin is warm, dry and intact. Lesion noted. No abrasion, no bruising, no burn, no ecchymosis, no laceration, no petechiae and no rash noted. She is not diaphoretic. No cyanosis or erythema. No pallor. Nails show no clubbing.   Skin is thin shiny and atrophic bilateral    Nails 1-5 bilateral are thick 3-4 mm, long 3-6 mm, and discolored with subungual debris    Focal hyperkeratotic lesions dorsal left second toe and bilateral second MTPJ plantar no discoloration   Psychiatric: She has a normal mood and affect. Her behavior is normal.             Assessment:       Encounter Diagnoses   Name Primary?    Peripheral vascular disease Yes    Onychomycosis due to dermatophyte     Corn or callus     Hammer toes of both feet     Valgus deformity of both great toes          Plan:       Dari was seen today for foot problem, callouses, nail problem and other misc.    Diagnoses and all orders for this visit:    Peripheral vascular disease    Onychomycosis due to dermatophyte    Corn or callus    Hammer toes of both feet    Valgus deformity of both great toes      I counseled the patient on her conditions, their implications and medical management.    - Shoe inspection. Patient instructed on proper foot  hygeine. We discussed wearing proper shoe gear, daily foot inspections, never walking without protective shoe gear, never putting sharp instruments to feet.    - With patient's permission, nails were aggressively reduced and debrided x 10 to their soft tissue attachment mechanically and with electric , removing all offending nail and debris. Patient relates relief following the procedure. She will continue to monitor the areas daily, inspect her feet, wear protective shoe gear when ambulatory, moisturizer to maintain skin integrity.    With patient's verbal consent the hyperkeratotic lesions x3 bilateral foot were trimmed to healthy appearing skin using a # 15 scalpel. Patient relates relief of pain following the procedure. Patient tolerated the procedure well without complications. No anesthesia or hemostasis required. No blood loss.    Discussed importance of wearing shoes with adequate room in toe box to accommodate her toe deformities. Recommended [place] or SAS shoes for daily use. Also advised to have a thumb width from end of big toe to end of toe box in her shoes    Would not recommend surgical intervention at this time, continue conservative care and routine care every 3 months as needed    Collin Perez DPM

## 2019-04-01 ENCOUNTER — HOSPITAL ENCOUNTER (OUTPATIENT)
Dept: RADIOLOGY | Facility: HOSPITAL | Age: 69
Discharge: HOME | End: 2019-04-01
Attending: NURSE PRACTITIONER
Payer: MEDICARE

## 2019-04-01 DIAGNOSIS — Z00.00 ENCOUNTER FOR PREVENTIVE HEALTH EXAMINATION: ICD-10-CM

## 2019-04-01 DIAGNOSIS — Z78.0 POSTMENOPAUSAL: ICD-10-CM

## 2019-04-01 DIAGNOSIS — Z12.39 SCREENING FOR BREAST CANCER: ICD-10-CM

## 2019-04-01 PROCEDURE — 77080 DXA BONE DENSITY AXIAL: CPT | Mod: 26,HCNC,, | Performed by: RADIOLOGY

## 2019-04-01 PROCEDURE — 77063 MAMMO DIGITAL SCREENING BILAT WITH TOMOSYNTHESIS_CAD: ICD-10-PCS | Mod: 26,HCNC,, | Performed by: RADIOLOGY

## 2019-04-01 PROCEDURE — 77080 DEXA BONE DENSITY SPINE HIP: ICD-10-PCS | Mod: 26,HCNC,, | Performed by: RADIOLOGY

## 2019-04-01 PROCEDURE — 77063 BREAST TOMOSYNTHESIS BI: CPT | Mod: 26,HCNC,, | Performed by: RADIOLOGY

## 2019-04-01 PROCEDURE — 77080 DXA BONE DENSITY AXIAL: CPT | Mod: TC,HCNC,PO

## 2019-04-01 PROCEDURE — 77067 SCR MAMMO BI INCL CAD: CPT | Mod: 26,HCNC,, | Performed by: RADIOLOGY

## 2019-04-01 PROCEDURE — 77067 SCR MAMMO BI INCL CAD: CPT | Mod: TC,HCNC,PO

## 2019-04-01 PROCEDURE — 77067 MAMMO DIGITAL SCREENING BILAT WITH TOMOSYNTHESIS_CAD: ICD-10-PCS | Mod: 26,HCNC,, | Performed by: RADIOLOGY

## 2019-04-02 ENCOUNTER — TELEPHONE (OUTPATIENT)
Dept: FAMILY MEDICINE | Facility: CLINIC | Age: 69
End: 2019-04-02

## 2019-04-02 NOTE — TELEPHONE ENCOUNTER
----- Message from Yisel Cisneros sent at 4/2/2019  8:31 AM CDT -----  Contact: patient  Type:  Patient Returning Call    Who Called:  patient  Who Left Message for Patient:  na  Does the patient know what this is regarding?:  yes  Best Call Back Number:  [phone] (home)   Additional Information:  Patient states she was talking to Missy and call was dropped. Please call back to advise. Sent message to the pod. Thanks!

## 2019-04-03 ENCOUNTER — TELEPHONE (OUTPATIENT)
Dept: FAMILY MEDICINE | Facility: CLINIC | Age: 69
End: 2019-04-03

## 2019-04-03 NOTE — TELEPHONE ENCOUNTER
----- Message from Nandini Cristina NP sent at 4/3/2019 11:29 AM CDT -----  Please let patient know that her blood work is acceptable, but she should follow up with  as scheduled to review in detail. Thank you

## 2019-04-03 NOTE — TELEPHONE ENCOUNTER
Pt notified Ms. Cristina reviewed her labs and states they are acceptable and Ms. Ibeth recommends that she keep her appt with Dr. Teran to review labs in detail.

## 2019-04-04 ENCOUNTER — TELEPHONE (OUTPATIENT)
Dept: RADIOLOGY | Facility: HOSPITAL | Age: 69
End: 2019-04-04

## 2019-04-10 ENCOUNTER — OFFICE VISIT (OUTPATIENT)
Dept: FAMILY MEDICINE | Facility: CLINIC | Age: 69
End: 2019-04-10
Payer: MEDICARE

## 2019-04-10 VITALS
BODY MASS INDEX: 35.84 KG/M2 | OXYGEN SATURATION: 98 % | HEART RATE: 84 BPM | SYSTOLIC BLOOD PRESSURE: 136 MMHG | DIASTOLIC BLOOD PRESSURE: 70 MMHG | WEIGHT: 215.38 LBS

## 2019-04-10 DIAGNOSIS — Z00.00 ROUTINE HEALTH MAINTENANCE: Primary | ICD-10-CM

## 2019-04-10 PROCEDURE — 99999 PR PBB SHADOW E&M-EST. PATIENT-LVL III: ICD-10-PCS | Mod: PBBFAC,HCNC,, | Performed by: FAMILY MEDICINE

## 2019-04-10 PROCEDURE — 99397 PER PM REEVAL EST PAT 65+ YR: CPT | Mod: HCNC,S$GLB,, | Performed by: FAMILY MEDICINE

## 2019-04-10 PROCEDURE — 99999 PR PBB SHADOW E&M-EST. PATIENT-LVL III: CPT | Mod: PBBFAC,HCNC,, | Performed by: FAMILY MEDICINE

## 2019-04-10 PROCEDURE — 99397 PR PREVENTIVE VISIT,EST,65 & OVER: ICD-10-PCS | Mod: HCNC,S$GLB,, | Performed by: FAMILY MEDICINE

## 2019-04-10 NOTE — PROGRESS NOTES
"HPI  Dari Polanco is a 68 y.o. female with multiple medical diagnoses as listed in the medical history and problem list that presents for Annual Exam  .      HPI  Here today for routine health maintenance.    PAST MEDICAL HISTORY:  Past Medical History:   Diagnosis Date    Arthritis     Bronchitis     Cancer     history of basal skin cancer status post excision    GERD (gastroesophageal reflux disease)     Obesity     Raynaud's syndrome     Retinal migraine     Tobacco dependence        PAST SURGICAL HISTORY:  Past Surgical History:   Procedure Laterality Date    BACK SURGERY       SECTION, CLASSIC      COLONOSCOPY  ?  ([place]?)    "Normal"    COLONOSCOPY N/A 2013    [facility]    EGD (ESOPHAGOGASTRODUODENOSCOPY) N/A 2013    [facility]    UPPER GASTROINTESTINAL ENDOSCOPY  ?  ([place]?)    "Acid reflux"       SOCIAL HISTORY:  Social History     Socioeconomic History    Marital status: Single     Spouse name: Not on file    Number of children: Not on file    Years of education: Not on file    Highest education level: Not on file   Occupational History    Not on file   Social Needs    Financial resource strain: Not on file    Food insecurity:     Worry: Not on file     Inability: Not on file    Transportation needs:     Medical: Not on file     Non-medical: Not on file   Tobacco Use    Smoking status: Current Every Day Smoker     Packs/day: 0.25     Years: 36.00     Pack years: 9.00     Types: Cigarettes    Smokeless tobacco: Never Used    Tobacco comment: 1/2 ppd   Substance and Sexual Activity    Alcohol use: No     Alcohol/week: 0.0 oz    Drug use: Not on file    Sexual activity: Not on file   Lifestyle    Physical activity:     Days per week: Not on file     Minutes per session: Not on file    Stress: Not on file   Relationships    Social connections:     Talks on phone: Not " on file     Gets together: Not on file     Attends Zoroastrian service: Not on file     Active member of club or organization: Not on file     Attends meetings of clubs or organizations: Not on file     Relationship status: Not on file   Other Topics Concern    Not on file   Social History Narrative    Not on file       FAMILY HISTORY:  Family History   Problem Relation Age of Onset    Esophageal cancer Father     Cancer Mother         'spinal column'    Breast cancer Mother     Diabetes Maternal Grandmother     Heart disease Maternal Grandfather     Breast cancer Sister        ALLERGIES AND MEDICATIONS: updated and reviewed.  Review of patient's allergies indicates:   Allergen Reactions    Codeine Itching    Penicillins Rash    Erythromycin Nausea And Vomiting    Lidocaine      'feels like I am on fire'     Current Outpatient Medications   Medication Sig Dispense Refill    aspirin (ECOTRIN) 81 MG EC tablet Take 81 mg by mouth once daily.      esomeprazole (NEXIUM) 20 MG capsule Take 20 mg by mouth before breakfast.      meloxicam (MOBIC) 7.5 MG tablet TAKE 1 TABLET(7.5 MG) BY MOUTH EVERY DAY (Patient taking differently: TAKE 1 TABLET(7.5 MG) BY MOUTH EVERY DAY AS NEEDED) 90 tablet 2    NIFEdipine (ADALAT CC) 30 MG TbSR TAKE 1 TABLET(30 MG) BY MOUTH EVERY DAY 90 tablet 2     No current facility-administered medications for this visit.        ROS  Review of Systems   Constitutional: Negative for fatigue, fever and unexpected weight change.        Recent fall walking in the street on the phone.  Lip injured.  Pain in right shoulder, limited abduction.  Small abrasion on right knee   HENT: Negative for congestion, hearing loss, rhinorrhea and sore throat.    Eyes: Negative for visual disturbance.   Respiratory: Negative for cough, chest tightness, shortness of breath and wheezing.    Cardiovascular: Negative for chest pain, palpitations and leg swelling.   Gastrointestinal: Negative for abdominal  distention, abdominal pain, blood in stool, constipation, diarrhea, nausea and vomiting.   Genitourinary: Negative for difficulty urinating, dysuria, frequency, hematuria, menstrual problem, pelvic pain, urgency and vaginal bleeding.   Musculoskeletal: Positive for back pain (improved with PT). Negative for joint swelling and neck pain.   Skin: Negative for rash.   Neurological: Negative for dizziness, tremors, weakness, light-headedness, numbness and headaches.   Psychiatric/Behavioral: Negative for confusion, dysphoric mood and sleep disturbance. The patient is not nervous/anxious.        Physical Exam  Vitals:    04/10/19 0851   BP: 136/70   Pulse: 84    Body mass index is 35.84 kg/m².  Weight: 97.7 kg (215 lb 6.2 oz)         Physical Exam   Constitutional: She is oriented to person, place, and time. She appears well-developed and well-nourished.   HENT:   Head: Normocephalic and atraumatic.   Right Ear: External ear normal.   Left Ear: External ear normal.   Nose: Nose normal.   Mouth/Throat: Oropharynx is clear and moist.   Eyes: Pupils are equal, round, and reactive to light. Conjunctivae and EOM are normal. No scleral icterus.   Neck: Normal range of motion. Neck supple. No JVD present. No thyromegaly present.   Cardiovascular: Normal rate, regular rhythm and normal heart sounds. Exam reveals no gallop and no friction rub.   No murmur heard.  Pulmonary/Chest: Effort normal and breath sounds normal. She has no wheezes. She has no rales.   Abdominal: Soft. Bowel sounds are normal. She exhibits no distension and no mass. There is no tenderness. There is no rebound and no guarding.   Musculoskeletal: Normal range of motion. She exhibits no edema.   Lymphadenopathy:     She has no cervical adenopathy.   Neurological: She is alert and oriented to person, place, and time. She has normal strength. No cranial nerve deficit or sensory deficit.   Skin: Skin is warm and dry. No rash noted.   Vitals reviewed.    Results  for orders placed or performed in visit on 04/01/19   Comprehensive metabolic panel   Result Value Ref Range    Sodium 142 136 - 145 mmol/L    Potassium 4.3 3.5 - 5.1 mmol/L    Chloride 106 95 - 110 mmol/L    CO2 26 23 - 29 mmol/L    Glucose 84 70 - 110 mg/dL    BUN, Bld 20 8 - 23 mg/dL    Creatinine 0.8 0.5 - 1.4 mg/dL    Calcium 9.0 8.7 - 10.5 mg/dL    Total Protein 6.6 6.0 - 8.4 g/dL    Albumin 3.5 3.5 - 5.2 g/dL    Total Bilirubin 0.5 0.1 - 1.0 mg/dL    Alkaline Phosphatase 90 55 - 135 U/L    AST 19 10 - 40 U/L    ALT 10 10 - 44 U/L    Anion Gap 10 8 - 16 mmol/L    eGFR if African American >60.0 >60 mL/min/1.73 m^2    eGFR if non African American >60.0 >60 mL/min/1.73 m^2   TSH   Result Value Ref Range    TSH 2.110 0.400 - 4.000 uIU/mL   Lipid panel   Result Value Ref Range    Cholesterol 187 120 - 199 mg/dL    Triglycerides 93 30 - 150 mg/dL    HDL 51 40 - 75 mg/dL    LDL Cholesterol 117.4 63.0 - 159.0 mg/dL    HDL/Chol Ratio 27.3 20.0 - 50.0 %    Total Cholesterol/HDL Ratio 3.7 2.0 - 5.0    Non-HDL Cholesterol 136 mg/dL        Health Maintenance       Date Due Completion Date    Influenza Vaccine 08/01/2018 9/27/2017    Override on 10/23/2015: Done    Mammogram 04/01/2020 4/1/2019    Override on 6/3/2013: Done    Colonoscopy 12/16/2020 12/16/2013    DEXA SCAN 04/01/2022 4/1/2019    Override on 5/16/2013: (N/S)    Lipid Panel 04/01/2024 4/1/2019    TETANUS VACCINE 08/24/2027 8/24/2017          Assessment & Plan    Routine health maintenance  - Health maintenance reviewed  - Diet and exercise education.  - Discussed possible PT for shoulder and strengthening  - Discussion about fall precautions        Follow up in about 1 year (around 4/10/2020).

## 2019-04-11 ENCOUNTER — HOSPITAL ENCOUNTER (OUTPATIENT)
Dept: RADIOLOGY | Facility: HOSPITAL | Age: 69
Discharge: HOME | End: 2019-04-11
Attending: NURSE PRACTITIONER
Payer: MEDICARE

## 2019-04-11 DIAGNOSIS — R92.8 ABNORMAL MAMMOGRAM OF LEFT BREAST: ICD-10-CM

## 2019-04-11 PROCEDURE — 76642 US BREAST LEFT LIMITED: ICD-10-PCS | Mod: 26,HCNC,LT, | Performed by: RADIOLOGY

## 2019-04-11 PROCEDURE — 77061 BREAST TOMOSYNTHESIS UNI: CPT | Mod: 26,HCNC,LT, | Performed by: RADIOLOGY

## 2019-04-11 PROCEDURE — 77065 MAMMO DIGITAL DIAGNOSTIC LEFT WITH TOMOSYNTHESIS_CAD: ICD-10-PCS | Mod: 26,HCNC,LT, | Performed by: RADIOLOGY

## 2019-04-11 PROCEDURE — 76642 ULTRASOUND BREAST LIMITED: CPT | Mod: 26,HCNC,LT, | Performed by: RADIOLOGY

## 2019-04-11 PROCEDURE — 77065 DX MAMMO INCL CAD UNI: CPT | Mod: 26,HCNC,LT, | Performed by: RADIOLOGY

## 2019-04-11 PROCEDURE — 76642 ULTRASOUND BREAST LIMITED: CPT | Mod: TC,HCNC,PO,LT

## 2019-04-11 PROCEDURE — 77065 DX MAMMO INCL CAD UNI: CPT | Mod: TC,HCNC,PO,LT

## 2019-04-11 PROCEDURE — 77061 MAMMO DIGITAL DIAGNOSTIC LEFT WITH TOMOSYNTHESIS_CAD: ICD-10-PCS | Mod: 26,HCNC,LT, | Performed by: RADIOLOGY

## 2019-04-11 PROCEDURE — 77061 BREAST TOMOSYNTHESIS UNI: CPT | Mod: TC,HCNC,PO,LT

## 2019-04-16 ENCOUNTER — TELEPHONE (OUTPATIENT)
Dept: FAMILY MEDICINE | Facility: CLINIC | Age: 69
End: 2019-04-16

## 2019-04-16 NOTE — TELEPHONE ENCOUNTER
----- Message from Nandini Cristina NP sent at 4/16/2019  9:36 AM CDT -----  Cyst to left breast is reported benign. Repeat mammogram in 1 year.

## 2019-04-16 NOTE — TELEPHONE ENCOUNTER
Pt notified the cyst to her breast is benign per Nandini result review. Ms. Polanco notified that Nandini recommends repeat the mammogram in one year. Pt voiced understanding of results and recommendation.

## 2019-10-28 ENCOUNTER — OFFICE VISIT (OUTPATIENT)
Dept: PODIATRY | Facility: CLINIC | Age: 69
End: 2019-10-28
Payer: MEDICARE

## 2019-10-28 VITALS
WEIGHT: 215.38 LBS | BODY MASS INDEX: 35.88 KG/M2 | HEIGHT: 65 IN | DIASTOLIC BLOOD PRESSURE: 74 MMHG | SYSTOLIC BLOOD PRESSURE: 120 MMHG | HEART RATE: 85 BPM

## 2019-10-28 DIAGNOSIS — M20.11 VALGUS DEFORMITY OF BOTH GREAT TOES: ICD-10-CM

## 2019-10-28 DIAGNOSIS — I87.2 VENOUS INSUFFICIENCY: Primary | ICD-10-CM

## 2019-10-28 DIAGNOSIS — I73.9 PERIPHERAL VASCULAR DISEASE: ICD-10-CM

## 2019-10-28 DIAGNOSIS — M20.12 VALGUS DEFORMITY OF BOTH GREAT TOES: ICD-10-CM

## 2019-10-28 DIAGNOSIS — M20.42 HAMMER TOES OF BOTH FEET: ICD-10-CM

## 2019-10-28 DIAGNOSIS — L84 CORN OR CALLUS: ICD-10-CM

## 2019-10-28 DIAGNOSIS — M20.41 HAMMER TOES OF BOTH FEET: ICD-10-CM

## 2019-10-28 DIAGNOSIS — E66.01 MORBID OBESITY DUE TO EXCESS CALORIES: ICD-10-CM

## 2019-10-28 PROCEDURE — 99999 PR PBB SHADOW E&M-EST. PATIENT-LVL III: CPT | Mod: PBBFAC,HCNC,, | Performed by: PODIATRIST

## 2019-10-28 PROCEDURE — 11721 DEBRIDE NAIL 6 OR MORE: CPT | Mod: Q9,59,HCNC,S$GLB | Performed by: PODIATRIST

## 2019-10-28 PROCEDURE — 99499 UNLISTED E&M SERVICE: CPT | Mod: HCNC,S$GLB,, | Performed by: PODIATRIST

## 2019-10-28 PROCEDURE — 99499 NO LOS: ICD-10-PCS | Mod: HCNC,S$GLB,, | Performed by: PODIATRIST

## 2019-10-28 PROCEDURE — 11721 PR DEBRIDEMENT OF NAILS, 6 OR MORE: ICD-10-PCS | Mod: Q9,59,HCNC,S$GLB | Performed by: PODIATRIST

## 2019-10-28 PROCEDURE — 99999 PR PBB SHADOW E&M-EST. PATIENT-LVL III: ICD-10-PCS | Mod: PBBFAC,HCNC,, | Performed by: PODIATRIST

## 2019-10-28 PROCEDURE — 11055 PARING/CUTG B9 HYPRKER LES 1: CPT | Mod: Q9,HCNC,LT,S$GLB | Performed by: PODIATRIST

## 2019-10-28 PROCEDURE — 11055 PR TRIM HYPERKERATOTIC SKIN LESION, ONE: ICD-10-PCS | Mod: Q9,HCNC,LT,S$GLB | Performed by: PODIATRIST

## 2019-10-28 NOTE — PROGRESS NOTES
Subjective:      Patient ID: Dari Polanco is a 69 y.o. female.    Chief Complaint: Bunions (left foot, PCP--04/10/2019) and Foot Problem (growth between big toe and 2nd toe)    Dari is a 69 y.o. female who presents to the clinic for evaluation and treatment of high risk feet. Dari has a past medical history of Arthritis, Bronchitis, Cancer, GERD (gastroesophageal reflux disease), Obesity, Raynaud's syndrome, Retinal migraine, and Tobacco dependence. The patient's chief complaint is a painful calluses left foot between the first two toes. This patient has documented high risk feet requiring routine maintenance secondary to peripheral vascular disease.    PCP: Sam Teran MD    Date Last Seen by PCP: 3/13/19    Current shoe gear:   Casual shoes    Last encounter in this department: Visit date not found    No results found for: HGBA1C      Review of Systems   Constitution: Negative for chills and fever.   Cardiovascular: Positive for leg swelling. Negative for claudication.   Respiratory: Negative for shortness of breath.    Skin: Positive for nail changes and suspicious lesions. Negative for itching and rash.   Musculoskeletal: Positive for arthritis. Negative for muscle cramps, muscle weakness and myalgias.   Gastrointestinal: Negative for nausea and vomiting.   Neurological: Negative for focal weakness, loss of balance, numbness and paresthesias.           Objective:      Physical Exam   Constitutional: She is oriented to person, place, and time. She appears well-developed and well-nourished. No distress.   Cardiovascular:   Pulses:       Dorsalis pedis pulses are 1+ on the right side, and 1+ on the left side.        Posterior tibial pulses are 1+ on the right side, and 1+ on the left side.   < 3 sec capillary refill time to toes 1-5 bilateral. Feet are warm to touch proximally with distal cooling b/l. There is no hair growth on the feet and toes b/l. There is moderate edema b/l with  varicosities present b/l.      Musculoskeletal:   Medial 1st MTPJ exostosis bilateral. Lateral deviation of hallux, non trackbound. No pain w/ ROM to 1st or 2nd MTPJs.    Reducible extensor and flexor contractures at the MTPJ and PIPJ of toes 2-5, bilat.     Equinus noted b/l ankles with < 10 deg DF noted. MMT 5/5 in DF/PF/Inv/Ev resistance with no reproduction of pain in any direction. Passive range of motion of ankle and pedal joints is painless b/l.     Neurological: She is alert and oriented to person, place, and time. She has normal strength. She displays no atrophy and no tremor. No sensory deficit. She exhibits normal muscle tone.   Negative tinel sign bilateral.   Skin: Skin is warm, dry and intact. Lesion noted. No abrasion, no bruising, no burn, no ecchymosis, no laceration, no petechiae and no rash noted. She is not diaphoretic. No cyanosis or erythema. No pallor. Nails show no clubbing.   Skin is thin shiny and atrophic bilateral    Nails 1-5 bilateral are thick 3-4 mm, long 3-6 mm, and discolored with subungual debris    Focal hyperkeratotic lesions medial left second toe    Psychiatric: She has a normal mood and affect. Her behavior is normal.             Assessment:       Encounter Diagnoses   Name Primary?    Venous insufficiency Yes    Morbid obesity due to excess calories     Peripheral vascular disease     Corn or callus     Hammer toes of both feet     Valgus deformity of both great toes          Plan:       Dari was seen today for bunions and foot problem.    Diagnoses and all orders for this visit:    Venous insufficiency    Morbid obesity due to excess calories    Peripheral vascular disease    Corn or callus    Hammer toes of both feet    Valgus deformity of both great toes      I counseled the patient on her conditions, their implications and medical management.    - Shoe inspection. Patient instructed on proper foot hygeine. We discussed wearing proper shoe gear, daily foot  inspections, never walking without protective shoe gear, never putting sharp instruments to feet.    - With patient's permission, nails were aggressively reduced and debrided x 10 to their soft tissue attachment mechanically and with electric , removing all offending nail and debris. Patient relates relief following the procedure. She will continue to monitor the areas daily, inspect her feet, wear protective shoe gear when ambulatory, moisturizer to maintain skin integrity.    With patient's verbal consent the hyperkeratotic lesions x1 left foot was trimmed to healthy appearing skin using a # 15 scalpel. Patient relates relief of pain following the procedure. Patient tolerated the procedure well without complications. No anesthesia or hemostasis required. No blood loss.    Discussed importance of wearing shoes with adequate room in toe box to accommodate her toe deformities. Recommended [place] or [employer] shoes for daily use. Also advised to have a thumb width from end of big toe to end of toe box in her shoes    Would not recommend surgical intervention at this time, continue conservative care and routine care every 3 months as needed    Collin Perez DPM

## 2020-01-01 ENCOUNTER — OFFICE VISIT (OUTPATIENT)
Dept: FAMILY MEDICINE | Facility: CLINIC | Age: 70
End: 2020-01-01
Payer: MEDICARE

## 2020-01-01 ENCOUNTER — PES CALL (OUTPATIENT)
Dept: ADMINISTRATIVE | Facility: CLINIC | Age: 70
End: 2020-01-01

## 2020-01-01 ENCOUNTER — TELEPHONE (OUTPATIENT)
Dept: FAMILY MEDICINE | Facility: CLINIC | Age: 70
End: 2020-01-01

## 2020-01-01 ENCOUNTER — CLINICAL SUPPORT (OUTPATIENT)
Dept: REHABILITATION | Facility: HOSPITAL | Age: 70
End: 2020-01-01
Payer: MEDICARE

## 2020-01-01 ENCOUNTER — OFFICE VISIT (OUTPATIENT)
Dept: DERMATOLOGY | Facility: CLINIC | Age: 70
End: 2020-01-01
Payer: MEDICARE

## 2020-01-01 ENCOUNTER — HOSPITAL ENCOUNTER (OUTPATIENT)
Dept: RADIOLOGY | Facility: HOSPITAL | Age: 70
Discharge: HOME | End: 2020-06-18
Attending: PHYSICIAN ASSISTANT
Payer: MEDICARE

## 2020-01-01 ENCOUNTER — LAB VISIT (OUTPATIENT)
Dept: LAB | Facility: HOSPITAL | Age: 70
End: 2020-01-01
Attending: FAMILY MEDICINE
Payer: MEDICARE

## 2020-01-01 ENCOUNTER — HOSPITAL ENCOUNTER (OUTPATIENT)
Dept: RADIOLOGY | Facility: HOSPITAL | Age: 70
Discharge: HOME | End: 2020-12-02
Attending: PHYSICIAN ASSISTANT
Payer: MEDICARE

## 2020-01-01 ENCOUNTER — TELEPHONE (OUTPATIENT)
Dept: DERMATOLOGY | Facility: CLINIC | Age: 70
End: 2020-01-01

## 2020-01-01 ENCOUNTER — PATIENT OUTREACH (OUTPATIENT)
Dept: ADMINISTRATIVE | Facility: HOSPITAL | Age: 70
End: 2020-01-01

## 2020-01-01 ENCOUNTER — HOSPITAL ENCOUNTER (OUTPATIENT)
Dept: RADIOLOGY | Facility: HOSPITAL | Age: 70
Discharge: HOME | End: 2020-12-29
Attending: PHYSICIAN ASSISTANT
Payer: MEDICARE

## 2020-01-01 VITALS — HEIGHT: 62 IN | WEIGHT: 195 LBS | BODY MASS INDEX: 35.88 KG/M2

## 2020-01-01 VITALS
WEIGHT: 198.19 LBS | OXYGEN SATURATION: 96 % | DIASTOLIC BLOOD PRESSURE: 60 MMHG | HEART RATE: 78 BPM | TEMPERATURE: 98 F | BODY MASS INDEX: 36.47 KG/M2 | SYSTOLIC BLOOD PRESSURE: 122 MMHG | HEIGHT: 62 IN

## 2020-01-01 VITALS
BODY MASS INDEX: 35.69 KG/M2 | TEMPERATURE: 99 F | HEART RATE: 80 BPM | SYSTOLIC BLOOD PRESSURE: 118 MMHG | OXYGEN SATURATION: 98 % | DIASTOLIC BLOOD PRESSURE: 78 MMHG | WEIGHT: 195.13 LBS

## 2020-01-01 VITALS
OXYGEN SATURATION: 96 % | DIASTOLIC BLOOD PRESSURE: 76 MMHG | WEIGHT: 191.38 LBS | BODY MASS INDEX: 35 KG/M2 | TEMPERATURE: 98 F | SYSTOLIC BLOOD PRESSURE: 142 MMHG | HEART RATE: 87 BPM

## 2020-01-01 VITALS
OXYGEN SATURATION: 97 % | SYSTOLIC BLOOD PRESSURE: 128 MMHG | BODY MASS INDEX: 35.69 KG/M2 | DIASTOLIC BLOOD PRESSURE: 82 MMHG | HEART RATE: 81 BPM | WEIGHT: 195.13 LBS

## 2020-01-01 VITALS — HEIGHT: 62 IN | BODY MASS INDEX: 35.88 KG/M2 | WEIGHT: 195 LBS

## 2020-01-01 VITALS
TEMPERATURE: 98 F | DIASTOLIC BLOOD PRESSURE: 70 MMHG | BODY MASS INDEX: 36.53 KG/M2 | HEART RATE: 77 BPM | WEIGHT: 199.75 LBS | OXYGEN SATURATION: 96 % | SYSTOLIC BLOOD PRESSURE: 126 MMHG

## 2020-01-01 DIAGNOSIS — I10 ESSENTIAL HYPERTENSION: ICD-10-CM

## 2020-01-01 DIAGNOSIS — M48.00 SPINAL STENOSIS, UNSPECIFIED SPINAL REGION: ICD-10-CM

## 2020-01-01 DIAGNOSIS — L21.9 SEBORRHEIC DERMATITIS: ICD-10-CM

## 2020-01-01 DIAGNOSIS — F17.200 TOBACCO DEPENDENCE: ICD-10-CM

## 2020-01-01 DIAGNOSIS — L21.9 SEBORRHEIC DERMATITIS OF SCALP: ICD-10-CM

## 2020-01-01 DIAGNOSIS — L29.9 SCALP ITCH: Primary | ICD-10-CM

## 2020-01-01 DIAGNOSIS — R53.81 PHYSICAL DECONDITIONING: ICD-10-CM

## 2020-01-01 DIAGNOSIS — I73.00 RAYNAUD'S DISEASE WITHOUT GANGRENE: ICD-10-CM

## 2020-01-01 DIAGNOSIS — I73.00 RAYNAUD'S PHENOMENON WITHOUT GANGRENE: ICD-10-CM

## 2020-01-01 DIAGNOSIS — Z12.31 ENCOUNTER FOR SCREENING MAMMOGRAM FOR MALIGNANT NEOPLASM OF BREAST: ICD-10-CM

## 2020-01-01 DIAGNOSIS — L81.4 LENTIGINES: ICD-10-CM

## 2020-01-01 DIAGNOSIS — G89.29 CHRONIC BILATERAL LOW BACK PAIN WITHOUT SCIATICA: Primary | ICD-10-CM

## 2020-01-01 DIAGNOSIS — M51.36 DDD (DEGENERATIVE DISC DISEASE), LUMBAR: ICD-10-CM

## 2020-01-01 DIAGNOSIS — Z12.39 SCREENING FOR BREAST CANCER: Primary | ICD-10-CM

## 2020-01-01 DIAGNOSIS — L29.9 SCALP ITCH: ICD-10-CM

## 2020-01-01 DIAGNOSIS — R29.818 OTHER SYMPTOMS AND SIGNS INVOLVING THE NERVOUS SYSTEM: ICD-10-CM

## 2020-01-01 DIAGNOSIS — K21.9 GASTROESOPHAGEAL REFLUX DISEASE, ESOPHAGITIS PRESENCE NOT SPECIFIED: ICD-10-CM

## 2020-01-01 DIAGNOSIS — R20.2 FORMICATION: ICD-10-CM

## 2020-01-01 DIAGNOSIS — G45.9 TIA (TRANSIENT ISCHEMIC ATTACK): Primary | ICD-10-CM

## 2020-01-01 DIAGNOSIS — M50.30 DDD (DEGENERATIVE DISC DISEASE), CERVICAL: ICD-10-CM

## 2020-01-01 DIAGNOSIS — D69.6 THROMBOCYTOPENIA: Primary | ICD-10-CM

## 2020-01-01 DIAGNOSIS — B85.0 HEAD LICE INFESTATION: Primary | ICD-10-CM

## 2020-01-01 DIAGNOSIS — G89.29 CHRONIC LEFT-SIDED LOW BACK PAIN WITHOUT SCIATICA: Primary | ICD-10-CM

## 2020-01-01 DIAGNOSIS — L29.9 PRURITIC CONDITION: ICD-10-CM

## 2020-01-01 DIAGNOSIS — M54.9 DORSALGIA, UNSPECIFIED: ICD-10-CM

## 2020-01-01 DIAGNOSIS — L82.1 SEBORRHEIC KERATOSES: Primary | ICD-10-CM

## 2020-01-01 DIAGNOSIS — M62.81 DECREASED MUSCLE STRENGTH: ICD-10-CM

## 2020-01-01 DIAGNOSIS — M54.50 CHRONIC LEFT-SIDED LOW BACK PAIN WITHOUT SCIATICA: Primary | ICD-10-CM

## 2020-01-01 DIAGNOSIS — E66.01 CLASS 2 SEVERE OBESITY DUE TO EXCESS CALORIES WITH SERIOUS COMORBIDITY AND BODY MASS INDEX (BMI) OF 35.0 TO 35.9 IN ADULT: ICD-10-CM

## 2020-01-01 DIAGNOSIS — M54.50 CHRONIC BILATERAL LOW BACK PAIN WITHOUT SCIATICA: Primary | ICD-10-CM

## 2020-01-01 DIAGNOSIS — E53.8 B12 DEFICIENCY: ICD-10-CM

## 2020-01-01 DIAGNOSIS — D36.9 SQUAMOUS PAPILLOMA: ICD-10-CM

## 2020-01-01 DIAGNOSIS — Z12.39 SCREENING FOR BREAST CANCER: ICD-10-CM

## 2020-01-01 DIAGNOSIS — D69.6 THROMBOCYTOPENIA: ICD-10-CM

## 2020-01-01 LAB
BASOPHILS # BLD AUTO: 0.05 K/UL (ref 0–0.2)
BASOPHILS NFR BLD: 0.6 % (ref 0–1.9)
DIFFERENTIAL METHOD: ABNORMAL
EOSINOPHIL # BLD AUTO: 0.1 K/UL (ref 0–0.5)
EOSINOPHIL NFR BLD: 0.7 % (ref 0–8)
ERYTHROCYTE [DISTWIDTH] IN BLOOD BY AUTOMATED COUNT: 14.4 % (ref 11.5–14.5)
HCT VFR BLD AUTO: 43.4 % (ref 37–48.5)
HGB BLD-MCNC: 13.6 G/DL (ref 12–16)
IMM GRANULOCYTES # BLD AUTO: 0.03 K/UL (ref 0–0.04)
IMM GRANULOCYTES NFR BLD AUTO: 0.4 % (ref 0–0.5)
LYMPHOCYTES # BLD AUTO: 1.8 K/UL (ref 1–4.8)
LYMPHOCYTES NFR BLD: 21.1 % (ref 18–48)
MCH RBC QN AUTO: 31.4 PG (ref 27–31)
MCHC RBC AUTO-ENTMCNC: 31.3 G/DL (ref 32–36)
MCV RBC AUTO: 100 FL (ref 82–98)
MONOCYTES # BLD AUTO: 0.7 K/UL (ref 0.3–1)
MONOCYTES NFR BLD: 8.5 % (ref 4–15)
NEUTROPHILS # BLD AUTO: 5.8 K/UL (ref 1.8–7.7)
NEUTROPHILS NFR BLD: 68.7 % (ref 38–73)
NRBC BLD-RTO: 0 /100 WBC
PLATELET # BLD AUTO: 150 K/UL (ref 150–350)
PMV BLD AUTO: 12.4 FL (ref 9.2–12.9)
RBC # BLD AUTO: 4.33 M/UL (ref 4–5.4)
VIT B12 SERPL-MCNC: 339 PG/ML (ref 210–950)
WBC # BLD AUTO: 8.48 K/UL (ref 3.9–12.7)

## 2020-01-01 PROCEDURE — 3008F BODY MASS INDEX DOCD: CPT | Mod: HCNC,CPTII,S$GLB, | Performed by: DERMATOLOGY

## 2020-01-01 PROCEDURE — 1159F PR MEDICATION LIST DOCUMENTED IN MEDICAL RECORD: ICD-10-PCS | Mod: HCNC,S$GLB,, | Performed by: DERMATOLOGY

## 2020-01-01 PROCEDURE — 90694 VACC AIIV4 NO PRSRV 0.5ML IM: CPT | Mod: HCNC,S$GLB,, | Performed by: FAMILY MEDICINE

## 2020-01-01 PROCEDURE — 99214 OFFICE O/P EST MOD 30 MIN: CPT | Mod: HCNC,S$GLB,, | Performed by: DERMATOLOGY

## 2020-01-01 PROCEDURE — 1159F MED LIST DOCD IN RCRD: CPT | Mod: HCNC,S$GLB,, | Performed by: FAMILY MEDICINE

## 2020-01-01 PROCEDURE — 3288F PR FALLS RISK ASSESSMENT DOCUMENTED: ICD-10-PCS | Mod: HCNC,CPTII,S$GLB, | Performed by: PHYSICIAN ASSISTANT

## 2020-01-01 PROCEDURE — 90694 FLU VACCINE - QUADRIVALENT - ADJUVANTED: ICD-10-PCS | Mod: HCNC,S$GLB,, | Performed by: FAMILY MEDICINE

## 2020-01-01 PROCEDURE — 99999 PR PBB SHADOW E&M-EST. PATIENT-LVL III: CPT | Mod: PBBFAC,HCNC,, | Performed by: DERMATOLOGY

## 2020-01-01 PROCEDURE — 1159F PR MEDICATION LIST DOCUMENTED IN MEDICAL RECORD: ICD-10-PCS | Mod: HCNC,S$GLB,, | Performed by: PHYSICIAN ASSISTANT

## 2020-01-01 PROCEDURE — 3288F FALL RISK ASSESSMENT DOCD: CPT | Mod: HCNC,CPTII,S$GLB, | Performed by: PHYSICIAN ASSISTANT

## 2020-01-01 PROCEDURE — 1159F MED LIST DOCD IN RCRD: CPT | Mod: HCNC,S$GLB,, | Performed by: PHYSICIAN ASSISTANT

## 2020-01-01 PROCEDURE — 1100F PR PT FALLS ASSESS DOC 2+ FALLS/FALL W/INJURY/YR: ICD-10-PCS | Mod: HCNC,CPTII,S$GLB, | Performed by: PHYSICIAN ASSISTANT

## 2020-01-01 PROCEDURE — 99214 PR OFFICE/OUTPT VISIT, EST, LEVL IV, 30-39 MIN: ICD-10-PCS | Mod: HCNC,S$GLB,, | Performed by: PHYSICIAN ASSISTANT

## 2020-01-01 PROCEDURE — 3008F BODY MASS INDEX DOCD: CPT | Mod: HCNC,CPTII,S$GLB, | Performed by: PHYSICIAN ASSISTANT

## 2020-01-01 PROCEDURE — 70553 MRI BRAIN STEM W/O & W/DYE: CPT | Mod: TC,HCNC,PO

## 2020-01-01 PROCEDURE — 1159F PR MEDICATION LIST DOCUMENTED IN MEDICAL RECORD: ICD-10-PCS | Mod: HCNC,S$GLB,, | Performed by: FAMILY MEDICINE

## 2020-01-01 PROCEDURE — 1101F PT FALLS ASSESS-DOCD LE1/YR: CPT | Mod: HCNC,CPTII,S$GLB, | Performed by: FAMILY MEDICINE

## 2020-01-01 PROCEDURE — 1126F AMNT PAIN NOTED NONE PRSNT: CPT | Mod: HCNC,S$GLB,, | Performed by: PHYSICIAN ASSISTANT

## 2020-01-01 PROCEDURE — 3008F PR BODY MASS INDEX (BMI) DOCUMENTED: ICD-10-PCS | Mod: HCNC,CPTII,S$GLB, | Performed by: DERMATOLOGY

## 2020-01-01 PROCEDURE — A9585 GADOBUTROL INJECTION: HCPCS | Mod: HCNC,PO | Performed by: PHYSICIAN ASSISTANT

## 2020-01-01 PROCEDURE — 1159F MED LIST DOCD IN RCRD: CPT | Mod: HCNC,S$GLB,, | Performed by: DERMATOLOGY

## 2020-01-01 PROCEDURE — 99999 PR PBB SHADOW E&M-EST. PATIENT-LVL III: CPT | Mod: PBBFAC,HCNC,, | Performed by: PHYSICIAN ASSISTANT

## 2020-01-01 PROCEDURE — 77067 MAMMO DIGITAL SCREENING BILAT WITH TOMOSYNTHESIS_CAD: ICD-10-PCS | Mod: 26,HCNC,, | Performed by: RADIOLOGY

## 2020-01-01 PROCEDURE — 99999 PR PBB SHADOW E&M-EST. PATIENT-LVL IV: ICD-10-PCS | Mod: PBBFAC,HCNC,, | Performed by: PHYSICIAN ASSISTANT

## 2020-01-01 PROCEDURE — 99202 PR OFFICE/OUTPT VISIT, NEW, LEVL II, 15-29 MIN: ICD-10-PCS | Mod: HCNC,S$GLB,, | Performed by: DERMATOLOGY

## 2020-01-01 PROCEDURE — 77067 SCR MAMMO BI INCL CAD: CPT | Mod: 26,HCNC,, | Performed by: RADIOLOGY

## 2020-01-01 PROCEDURE — 72100 X-RAY EXAM L-S SPINE 2/3 VWS: CPT | Mod: TC,HCNC,FY,PO

## 2020-01-01 PROCEDURE — 99999 PR PBB SHADOW E&M-EST. PATIENT-LVL III: ICD-10-PCS | Mod: PBBFAC,HCNC,, | Performed by: PHYSICIAN ASSISTANT

## 2020-01-01 PROCEDURE — 99214 PR OFFICE/OUTPT VISIT, EST, LEVL IV, 30-39 MIN: ICD-10-PCS | Mod: 25,HCNC,S$GLB, | Performed by: FAMILY MEDICINE

## 2020-01-01 PROCEDURE — 1126F AMNT PAIN NOTED NONE PRSNT: CPT | Mod: HCNC,S$GLB,, | Performed by: FAMILY MEDICINE

## 2020-01-01 PROCEDURE — 99499 RISK ADDL DX/OHS AUDIT: ICD-10-PCS | Mod: HCNC,S$GLB,, | Performed by: PHYSICIAN ASSISTANT

## 2020-01-01 PROCEDURE — 85025 COMPLETE CBC W/AUTO DIFF WBC: CPT | Mod: HCNC

## 2020-01-01 PROCEDURE — 99999 PR PBB SHADOW E&M-EST. PATIENT-LVL III: ICD-10-PCS | Mod: PBBFAC,HCNC,, | Performed by: FAMILY MEDICINE

## 2020-01-01 PROCEDURE — 97110 THERAPEUTIC EXERCISES: CPT | Mod: HCNC,PO | Performed by: PHYSICAL THERAPIST

## 2020-01-01 PROCEDURE — 1101F PR PT FALLS ASSESS DOC 0-1 FALLS W/OUT INJ PAST YR: ICD-10-PCS | Mod: HCNC,CPTII,S$GLB, | Performed by: DERMATOLOGY

## 2020-01-01 PROCEDURE — 1101F PT FALLS ASSESS-DOCD LE1/YR: CPT | Mod: HCNC,CPTII,S$GLB, | Performed by: PHYSICIAN ASSISTANT

## 2020-01-01 PROCEDURE — 99214 OFFICE O/P EST MOD 30 MIN: CPT | Mod: HCNC,S$GLB,, | Performed by: FAMILY MEDICINE

## 2020-01-01 PROCEDURE — 99499 UNLISTED E&M SERVICE: CPT | Mod: HCNC,S$GLB,, | Performed by: PHYSICIAN ASSISTANT

## 2020-01-01 PROCEDURE — 99214 OFFICE O/P EST MOD 30 MIN: CPT | Mod: HCNC,S$GLB,, | Performed by: PHYSICIAN ASSISTANT

## 2020-01-01 PROCEDURE — 99999 PR PBB SHADOW E&M-EST. PATIENT-LVL V: CPT | Mod: PBBFAC,HCNC,, | Performed by: PHYSICIAN ASSISTANT

## 2020-01-01 PROCEDURE — G0008 PR ADMIN INFLUENZA VIRUS VAC: ICD-10-PCS | Mod: HCNC,S$GLB,, | Performed by: FAMILY MEDICINE

## 2020-01-01 PROCEDURE — 1126F PR PAIN SEVERITY QUANTIFIED, NO PAIN PRESENT: ICD-10-PCS | Mod: HCNC,S$GLB,, | Performed by: PHYSICIAN ASSISTANT

## 2020-01-01 PROCEDURE — 70553 MRI BRAIN W WO CONTRAST: ICD-10-PCS | Mod: 26,HCNC,, | Performed by: RADIOLOGY

## 2020-01-01 PROCEDURE — 1101F PR PT FALLS ASSESS DOC 0-1 FALLS W/OUT INJ PAST YR: ICD-10-PCS | Mod: HCNC,CPTII,S$GLB, | Performed by: FAMILY MEDICINE

## 2020-01-01 PROCEDURE — 72100 X-RAY EXAM L-S SPINE 2/3 VWS: CPT | Mod: 26,HCNC,, | Performed by: RADIOLOGY

## 2020-01-01 PROCEDURE — 77063 MAMMO DIGITAL SCREENING BILAT WITH TOMOSYNTHESIS_CAD: ICD-10-PCS | Mod: 26,HCNC,, | Performed by: RADIOLOGY

## 2020-01-01 PROCEDURE — 99214 OFFICE O/P EST MOD 30 MIN: CPT | Mod: 25,HCNC,S$GLB, | Performed by: FAMILY MEDICINE

## 2020-01-01 PROCEDURE — 99999 PR PBB SHADOW E&M-EST. PATIENT-LVL IV: CPT | Mod: PBBFAC,HCNC,, | Performed by: PHYSICIAN ASSISTANT

## 2020-01-01 PROCEDURE — 99214 PR OFFICE/OUTPT VISIT, EST, LEVL IV, 30-39 MIN: ICD-10-PCS | Mod: HCNC,S$GLB,, | Performed by: FAMILY MEDICINE

## 2020-01-01 PROCEDURE — 1101F PR PT FALLS ASSESS DOC 0-1 FALLS W/OUT INJ PAST YR: ICD-10-PCS | Mod: HCNC,CPTII,S$GLB, | Performed by: PHYSICIAN ASSISTANT

## 2020-01-01 PROCEDURE — 99999 PR PBB SHADOW E&M-EST. PATIENT-LVL III: CPT | Mod: PBBFAC,HCNC,, | Performed by: FAMILY MEDICINE

## 2020-01-01 PROCEDURE — 99202 OFFICE O/P NEW SF 15 MIN: CPT | Mod: HCNC,S$GLB,, | Performed by: DERMATOLOGY

## 2020-01-01 PROCEDURE — 3008F PR BODY MASS INDEX (BMI) DOCUMENTED: ICD-10-PCS | Mod: HCNC,CPTII,S$GLB, | Performed by: PHYSICIAN ASSISTANT

## 2020-01-01 PROCEDURE — 1101F PT FALLS ASSESS-DOCD LE1/YR: CPT | Mod: HCNC,CPTII,S$GLB, | Performed by: DERMATOLOGY

## 2020-01-01 PROCEDURE — 25500020 PHARM REV CODE 255: Mod: HCNC,PO | Performed by: PHYSICIAN ASSISTANT

## 2020-01-01 PROCEDURE — 99999 PR PBB SHADOW E&M-EST. PATIENT-LVL V: ICD-10-PCS | Mod: PBBFAC,HCNC,, | Performed by: PHYSICIAN ASSISTANT

## 2020-01-01 PROCEDURE — 72100 XR LUMBAR SPINE AP AND LATERAL: ICD-10-PCS | Mod: 26,HCNC,, | Performed by: RADIOLOGY

## 2020-01-01 PROCEDURE — 1126F PR PAIN SEVERITY QUANTIFIED, NO PAIN PRESENT: ICD-10-PCS | Mod: HCNC,S$GLB,, | Performed by: FAMILY MEDICINE

## 2020-01-01 PROCEDURE — 3008F PR BODY MASS INDEX (BMI) DOCUMENTED: ICD-10-PCS | Mod: HCNC,CPTII,S$GLB, | Performed by: FAMILY MEDICINE

## 2020-01-01 PROCEDURE — 77067 SCR MAMMO BI INCL CAD: CPT | Mod: TC,HCNC,PO

## 2020-01-01 PROCEDURE — 82607 VITAMIN B-12: CPT | Mod: HCNC

## 2020-01-01 PROCEDURE — 1100F PTFALLS ASSESS-DOCD GE2>/YR: CPT | Mod: HCNC,CPTII,S$GLB, | Performed by: PHYSICIAN ASSISTANT

## 2020-01-01 PROCEDURE — 99999 PR PBB SHADOW E&M-EST. PATIENT-LVL III: ICD-10-PCS | Mod: PBBFAC,HCNC,, | Performed by: DERMATOLOGY

## 2020-01-01 PROCEDURE — 97161 PT EVAL LOW COMPLEX 20 MIN: CPT | Mod: HCNC,PO | Performed by: PHYSICAL THERAPIST

## 2020-01-01 PROCEDURE — 99214 PR OFFICE/OUTPT VISIT, EST, LEVL IV, 30-39 MIN: ICD-10-PCS | Mod: HCNC,S$GLB,, | Performed by: DERMATOLOGY

## 2020-01-01 PROCEDURE — 3008F BODY MASS INDEX DOCD: CPT | Mod: HCNC,CPTII,S$GLB, | Performed by: FAMILY MEDICINE

## 2020-01-01 PROCEDURE — 1126F AMNT PAIN NOTED NONE PRSNT: CPT | Mod: HCNC,S$GLB,, | Performed by: DERMATOLOGY

## 2020-01-01 PROCEDURE — 99499 UNLISTED E&M SERVICE: CPT | Mod: S$GLB,,, | Performed by: FAMILY MEDICINE

## 2020-01-01 PROCEDURE — 99499 RISK ADDL DX/OHS AUDIT: ICD-10-PCS | Mod: S$GLB,,, | Performed by: FAMILY MEDICINE

## 2020-01-01 PROCEDURE — G0008 ADMIN INFLUENZA VIRUS VAC: HCPCS | Mod: HCNC,S$GLB,, | Performed by: FAMILY MEDICINE

## 2020-01-01 PROCEDURE — 1126F PR PAIN SEVERITY QUANTIFIED, NO PAIN PRESENT: ICD-10-PCS | Mod: HCNC,S$GLB,, | Performed by: DERMATOLOGY

## 2020-01-01 PROCEDURE — 36415 COLL VENOUS BLD VENIPUNCTURE: CPT | Mod: HCNC,PO

## 2020-01-01 PROCEDURE — 77063 BREAST TOMOSYNTHESIS BI: CPT | Mod: 26,HCNC,, | Performed by: RADIOLOGY

## 2020-01-01 PROCEDURE — 70553 MRI BRAIN STEM W/O & W/DYE: CPT | Mod: 26,HCNC,, | Performed by: RADIOLOGY

## 2020-01-01 RX ORDER — HYDROCORTISONE 25 MG/G
CREAM TOPICAL 2 TIMES DAILY
Qty: 28 G | Refills: 1 | Status: SHIPPED | OUTPATIENT
Start: 2020-01-01

## 2020-01-01 RX ORDER — LANOLIN ALCOHOL/MO/W.PET/CERES
100 CREAM (GRAM) TOPICAL DAILY
COMMUNITY

## 2020-01-01 RX ORDER — NIFEDIPINE 30 MG/1
TABLET, FILM COATED, EXTENDED RELEASE ORAL
Qty: 90 TABLET | Refills: 3 | Status: SHIPPED | OUTPATIENT
Start: 2020-01-01 | End: 2021-01-01

## 2020-01-01 RX ORDER — KETOCONAZOLE 20 MG/ML
SHAMPOO, SUSPENSION TOPICAL
Qty: 120 ML | Refills: 1 | Status: SHIPPED | OUTPATIENT
Start: 2020-01-01 | End: 2020-01-01 | Stop reason: CLARIF

## 2020-01-01 RX ORDER — CLOBETASOL PROPIONATE 0.46 MG/ML
SOLUTION TOPICAL 2 TIMES DAILY
Qty: 25 ML | Refills: 0 | Status: SHIPPED | OUTPATIENT
Start: 2020-01-01 | End: 2020-01-01 | Stop reason: SDUPTHER

## 2020-01-01 RX ORDER — GADOBUTROL 604.72 MG/ML
8 INJECTION INTRAVENOUS
Status: COMPLETED | OUTPATIENT
Start: 2020-01-01 | End: 2020-01-01

## 2020-01-01 RX ORDER — CLOBETASOL PROPIONATE 0.46 MG/ML
SOLUTION TOPICAL 2 TIMES DAILY
Qty: 50 ML | Refills: 1 | Status: SHIPPED | OUTPATIENT
Start: 2020-01-01 | End: 2021-01-01 | Stop reason: SDUPTHER

## 2020-01-01 RX ORDER — CLOBETASOL PROPIONATE 0.46 MG/ML
SOLUTION TOPICAL 2 TIMES DAILY
Qty: 50 ML | Refills: 1 | Status: SHIPPED | OUTPATIENT
Start: 2020-01-01 | End: 2020-01-01 | Stop reason: SDUPTHER

## 2020-01-01 RX ORDER — HYDROGEN PEROXIDE 3 %
20 SOLUTION, NON-ORAL MISCELLANEOUS
Qty: 90 CAPSULE | Refills: 3 | Status: SHIPPED | OUTPATIENT
Start: 2020-01-01

## 2020-01-01 RX ORDER — MELOXICAM 7.5 MG/1
7.5 TABLET ORAL DAILY PRN
Qty: 20 TABLET | Refills: 1 | Status: SHIPPED | OUTPATIENT
Start: 2020-01-01 | End: 2020-01-01 | Stop reason: SDUPTHER

## 2020-01-01 RX ORDER — MELOXICAM 7.5 MG/1
7.5 TABLET ORAL DAILY PRN
Qty: 90 TABLET | Refills: 3 | Status: SHIPPED | OUTPATIENT
Start: 2020-01-01 | End: 2021-01-01

## 2020-01-01 RX ORDER — THERMOMETER, ELECTRONIC,ORAL
EACH MISCELLANEOUS ONCE
Refills: 0 | COMMUNITY
Start: 2020-01-01 | End: 2020-01-01

## 2020-01-01 RX ORDER — HYDROXYZINE HYDROCHLORIDE 25 MG/1
1 TABLET, FILM COATED ORAL NIGHTLY PRN
COMMUNITY
Start: 2020-01-01 | End: 2020-01-01 | Stop reason: CLARIF

## 2020-01-01 RX ADMIN — GADOBUTROL 8 ML: 604.72 INJECTION INTRAVENOUS at 02:12

## 2020-01-24 ENCOUNTER — TELEPHONE (OUTPATIENT)
Dept: FAMILY MEDICINE | Facility: CLINIC | Age: 70
End: 2020-01-24

## 2020-03-09 ENCOUNTER — PES CALL (OUTPATIENT)
Dept: ADMINISTRATIVE | Facility: CLINIC | Age: 70
End: 2020-03-09

## 2020-03-12 ENCOUNTER — OFFICE VISIT (OUTPATIENT)
Dept: FAMILY MEDICINE | Facility: CLINIC | Age: 70
End: 2020-03-12
Payer: MEDICARE

## 2020-03-12 VITALS
OXYGEN SATURATION: 99 % | HEART RATE: 65 BPM | WEIGHT: 198.19 LBS | DIASTOLIC BLOOD PRESSURE: 78 MMHG | SYSTOLIC BLOOD PRESSURE: 122 MMHG | HEIGHT: 63 IN | TEMPERATURE: 98 F | BODY MASS INDEX: 35.12 KG/M2

## 2020-03-12 DIAGNOSIS — M96.1 POST LAMINECTOMY SYNDROME: ICD-10-CM

## 2020-03-12 DIAGNOSIS — M48.00 SPINAL STENOSIS, UNSPECIFIED SPINAL REGION: ICD-10-CM

## 2020-03-12 DIAGNOSIS — E66.01 MORBID OBESITY DUE TO EXCESS CALORIES: ICD-10-CM

## 2020-03-12 DIAGNOSIS — R27.0 ATAXIA, UNSPECIFIED: ICD-10-CM

## 2020-03-12 DIAGNOSIS — R53.1 WEAKNESS: ICD-10-CM

## 2020-03-12 DIAGNOSIS — Z13.6 SCREENING FOR CARDIOVASCULAR CONDITION: Primary | ICD-10-CM

## 2020-03-12 DIAGNOSIS — I73.9 PERIPHERAL VASCULAR DISEASE: ICD-10-CM

## 2020-03-12 PROBLEM — D69.6 THROMBOCYTOPENIA, UNSPECIFIED: Status: ACTIVE | Noted: 2020-03-12

## 2020-03-12 PROCEDURE — 3288F FALL RISK ASSESSMENT DOCD: CPT | Mod: HCNC,CPTII,S$GLB, | Performed by: PHYSICIAN ASSISTANT

## 2020-03-12 PROCEDURE — 99999 PR PBB SHADOW E&M-EST. PATIENT-LVL IV: CPT | Mod: PBBFAC,HCNC,, | Performed by: PHYSICIAN ASSISTANT

## 2020-03-12 PROCEDURE — 1100F PR PT FALLS ASSESS DOC 2+ FALLS/FALL W/INJURY/YR: ICD-10-PCS | Mod: HCNC,CPTII,S$GLB, | Performed by: PHYSICIAN ASSISTANT

## 2020-03-12 PROCEDURE — 1159F PR MEDICATION LIST DOCUMENTED IN MEDICAL RECORD: ICD-10-PCS | Mod: HCNC,S$GLB,, | Performed by: PHYSICIAN ASSISTANT

## 2020-03-12 PROCEDURE — 3288F PR FALLS RISK ASSESSMENT DOCUMENTED: ICD-10-PCS | Mod: HCNC,CPTII,S$GLB, | Performed by: PHYSICIAN ASSISTANT

## 2020-03-12 PROCEDURE — 99214 PR OFFICE/OUTPT VISIT, EST, LEVL IV, 30-39 MIN: ICD-10-PCS | Mod: HCNC,S$GLB,, | Performed by: PHYSICIAN ASSISTANT

## 2020-03-12 PROCEDURE — 99499 UNLISTED E&M SERVICE: CPT | Mod: HCNC,S$GLB,, | Performed by: PHYSICIAN ASSISTANT

## 2020-03-12 PROCEDURE — 1159F MED LIST DOCD IN RCRD: CPT | Mod: HCNC,S$GLB,, | Performed by: PHYSICIAN ASSISTANT

## 2020-03-12 PROCEDURE — 1100F PTFALLS ASSESS-DOCD GE2>/YR: CPT | Mod: HCNC,CPTII,S$GLB, | Performed by: PHYSICIAN ASSISTANT

## 2020-03-12 PROCEDURE — 99499 RISK ADDL DX/OHS AUDIT: ICD-10-PCS | Mod: HCNC,S$GLB,, | Performed by: PHYSICIAN ASSISTANT

## 2020-03-12 PROCEDURE — 99214 OFFICE O/P EST MOD 30 MIN: CPT | Mod: HCNC,S$GLB,, | Performed by: PHYSICIAN ASSISTANT

## 2020-03-12 PROCEDURE — 1126F AMNT PAIN NOTED NONE PRSNT: CPT | Mod: HCNC,S$GLB,, | Performed by: PHYSICIAN ASSISTANT

## 2020-03-12 PROCEDURE — 99999 PR PBB SHADOW E&M-EST. PATIENT-LVL IV: ICD-10-PCS | Mod: PBBFAC,HCNC,, | Performed by: PHYSICIAN ASSISTANT

## 2020-03-12 PROCEDURE — 1126F PR PAIN SEVERITY QUANTIFIED, NO PAIN PRESENT: ICD-10-PCS | Mod: HCNC,S$GLB,, | Performed by: PHYSICIAN ASSISTANT

## 2020-03-12 NOTE — PROGRESS NOTES
Subjective:       Patient ID: Dari Polanco is a 69 y.o. female    Chief Complaint: Fatigue (legs, arms and balance)    HPI  The patient presents today complaining of fatigue, trouble with balance and trouble standing or walking long distances.  She has been feeling increasingly weak and has trouble getting up from a seated position.  She denies any recent falls.  She has a long history of lower back pain and has fine surgery over 10 years ago.  Her MRI from 2018 showed some mild spinal stenosis and facet disease.    Review of Systems   Constitutional: Negative for activity change, chills and fever.   HENT: Negative for congestion and sore throat.    Eyes: Negative for visual disturbance.   Respiratory: Negative for cough and shortness of breath.    Cardiovascular: Negative for chest pain and palpitations.   Gastrointestinal: Negative for abdominal pain, diarrhea, nausea and vomiting.   Endocrine: Negative for polydipsia and polyuria.   Musculoskeletal: Positive for back pain and gait problem. Negative for myalgias.   Skin: Negative for rash.   Neurological: Negative for headaches.   Psychiatric/Behavioral: The patient is not nervous/anxious.         Objective:   Physical Exam   Constitutional: She is oriented to person, place, and time. She appears well-developed. No distress.   Morbidly obese   HENT:   Head: Normocephalic and atraumatic.   Eyes: Pupils are equal, round, and reactive to light. EOM are normal.   Neck: Normal range of motion. Neck supple.   Cardiovascular: Normal rate, regular rhythm, normal heart sounds and intact distal pulses. Exam reveals no gallop and no friction rub.   No murmur heard.  Pulmonary/Chest: Effort normal and breath sounds normal. No respiratory distress. She has no wheezes. She has no rales. She exhibits no tenderness.   Abdominal: Soft. Bowel sounds are normal. She exhibits no distension and no mass. There is no tenderness. There is no guarding.   Musculoskeletal: Normal range  of motion.   Shuffling gait, kyphosis   Neurological: She is alert and oriented to person, place, and time.   Skin: Skin is warm and dry. No rash noted. She is not diaphoretic.   Psychiatric: She has a normal mood and affect. Her behavior is normal. Judgment and thought content normal.        Assessment:       1. Screening for cardiovascular condition  Comprehensive metabolic panel    Lipid panel   2. Morbid obesity due to excess calories     3. Peripheral vascular disease     4. Spinal stenosis, unspecified spinal region  Ambulatory referral/consult to Physical/Occupational Therapy   5. Post laminectomy syndrome  Ambulatory referral/consult to Physical/Occupational Therapy   6. Weakness  CBC auto differential    TSH    Vitamin B12   7. Ataxia, unspecified   Vitamin B12        Plan:       Screening for cardiovascular condition  -     Comprehensive metabolic panel; Future; Expected date: 03/12/2020  -     Lipid panel; Future; Expected date: 03/12/2020    Morbid obesity due to excess calories  - weight loss recommendations    Peripheral vascular disease  - continue current    Spinal stenosis, unspecified spinal region  -     Ambulatory referral/consult to Physical/Occupational Therapy; Future; Expected date: 03/19/2020    Post laminectomy syndrome  -     Ambulatory referral/consult to Physical/Occupational Therapy; Future; Expected date: 03/19/2020    Weakness  -     CBC auto differential; Future; Expected date: 03/12/2020  -     TSH; Future; Expected date: 03/12/2020  -     Vitamin B12; Future; Expected date: 03/12/2020    Ataxia, unspecified   -     Vitamin B12; Future; Expected date: 03/12/2020

## 2020-03-19 ENCOUNTER — LAB VISIT (OUTPATIENT)
Dept: LAB | Facility: HOSPITAL | Age: 70
End: 2020-03-19
Attending: PHYSICIAN ASSISTANT
Payer: MEDICARE

## 2020-03-19 ENCOUNTER — CLINICAL SUPPORT (OUTPATIENT)
Dept: REHABILITATION | Facility: HOSPITAL | Age: 70
End: 2020-03-19
Payer: MEDICARE

## 2020-03-19 DIAGNOSIS — Z13.6 SCREENING FOR CARDIOVASCULAR CONDITION: ICD-10-CM

## 2020-03-19 DIAGNOSIS — R27.0 ATAXIA, UNSPECIFIED: ICD-10-CM

## 2020-03-19 DIAGNOSIS — M96.1 POST LAMINECTOMY SYNDROME: ICD-10-CM

## 2020-03-19 DIAGNOSIS — D69.6 THROMBOCYTOPENIA: Primary | ICD-10-CM

## 2020-03-19 DIAGNOSIS — M48.00 SPINAL STENOSIS, UNSPECIFIED SPINAL REGION: ICD-10-CM

## 2020-03-19 DIAGNOSIS — M54.50 CHRONIC BILATERAL LOW BACK PAIN WITHOUT SCIATICA: ICD-10-CM

## 2020-03-19 DIAGNOSIS — G89.29 CHRONIC BILATERAL LOW BACK PAIN WITHOUT SCIATICA: ICD-10-CM

## 2020-03-19 DIAGNOSIS — R53.1 WEAKNESS: ICD-10-CM

## 2020-03-19 LAB
ALBUMIN SERPL BCP-MCNC: 3.5 G/DL (ref 3.5–5.2)
ALP SERPL-CCNC: 93 U/L (ref 55–135)
ALT SERPL W/O P-5'-P-CCNC: 9 U/L (ref 10–44)
ANION GAP SERPL CALC-SCNC: 8 MMOL/L (ref 8–16)
AST SERPL-CCNC: 17 U/L (ref 10–40)
BASOPHILS # BLD AUTO: 0.06 K/UL (ref 0–0.2)
BASOPHILS NFR BLD: 0.9 % (ref 0–1.9)
BILIRUB SERPL-MCNC: 0.5 MG/DL (ref 0.1–1)
BUN SERPL-MCNC: 17 MG/DL (ref 8–23)
CALCIUM SERPL-MCNC: 8.9 MG/DL (ref 8.7–10.5)
CHLORIDE SERPL-SCNC: 108 MMOL/L (ref 95–110)
CHOLEST SERPL-MCNC: 185 MG/DL (ref 120–199)
CHOLEST/HDLC SERPL: 3.6 {RATIO} (ref 2–5)
CO2 SERPL-SCNC: 28 MMOL/L (ref 23–29)
CREAT SERPL-MCNC: 0.9 MG/DL (ref 0.5–1.4)
DIFFERENTIAL METHOD: ABNORMAL
EOSINOPHIL # BLD AUTO: 0.1 K/UL (ref 0–0.5)
EOSINOPHIL NFR BLD: 1.9 % (ref 0–8)
ERYTHROCYTE [DISTWIDTH] IN BLOOD BY AUTOMATED COUNT: 13.6 % (ref 11.5–14.5)
EST. GFR  (AFRICAN AMERICAN): >60 ML/MIN/1.73 M^2
EST. GFR  (NON AFRICAN AMERICAN): >60 ML/MIN/1.73 M^2
GLUCOSE SERPL-MCNC: 83 MG/DL (ref 70–110)
HCT VFR BLD AUTO: 47.6 % (ref 37–48.5)
HDLC SERPL-MCNC: 52 MG/DL (ref 40–75)
HDLC SERPL: 28.1 % (ref 20–50)
HGB BLD-MCNC: 14.8 G/DL (ref 12–16)
IMM GRANULOCYTES # BLD AUTO: 0.02 K/UL (ref 0–0.04)
IMM GRANULOCYTES NFR BLD AUTO: 0.3 % (ref 0–0.5)
LDLC SERPL CALC-MCNC: 114.2 MG/DL (ref 63–159)
LYMPHOCYTES # BLD AUTO: 1.8 K/UL (ref 1–4.8)
LYMPHOCYTES NFR BLD: 27.5 % (ref 18–48)
MCH RBC QN AUTO: 30.8 PG (ref 27–31)
MCHC RBC AUTO-ENTMCNC: 31.1 G/DL (ref 32–36)
MCV RBC AUTO: 99 FL (ref 82–98)
MONOCYTES # BLD AUTO: 0.5 K/UL (ref 0.3–1)
MONOCYTES NFR BLD: 7.6 % (ref 4–15)
NEUTROPHILS # BLD AUTO: 4 K/UL (ref 1.8–7.7)
NEUTROPHILS NFR BLD: 61.8 % (ref 38–73)
NONHDLC SERPL-MCNC: 133 MG/DL
NRBC BLD-RTO: 0 /100 WBC
PLATELET # BLD AUTO: 90 K/UL (ref 150–350)
PMV BLD AUTO: 12.7 FL (ref 9.2–12.9)
POTASSIUM SERPL-SCNC: 4.2 MMOL/L (ref 3.5–5.1)
PROT SERPL-MCNC: 6.7 G/DL (ref 6–8.4)
RBC # BLD AUTO: 4.81 M/UL (ref 4–5.4)
SODIUM SERPL-SCNC: 144 MMOL/L (ref 136–145)
TRIGL SERPL-MCNC: 94 MG/DL (ref 30–150)
TSH SERPL DL<=0.005 MIU/L-ACNC: 2.83 UIU/ML (ref 0.4–4)
VIT B12 SERPL-MCNC: 190 PG/ML (ref 210–950)
WBC # BLD AUTO: 6.44 K/UL (ref 3.9–12.7)

## 2020-03-19 PROCEDURE — 97161 PT EVAL LOW COMPLEX 20 MIN: CPT | Mod: HCNC,PO | Performed by: PHYSICAL THERAPIST

## 2020-03-19 PROCEDURE — 80061 LIPID PANEL: CPT | Mod: HCNC

## 2020-03-19 PROCEDURE — 82607 VITAMIN B-12: CPT | Mod: HCNC

## 2020-03-19 PROCEDURE — 85025 COMPLETE CBC W/AUTO DIFF WBC: CPT | Mod: HCNC

## 2020-03-19 PROCEDURE — 84443 ASSAY THYROID STIM HORMONE: CPT | Mod: HCNC

## 2020-03-19 PROCEDURE — 80053 COMPREHEN METABOLIC PANEL: CPT | Mod: HCNC

## 2020-03-19 PROCEDURE — 36415 COLL VENOUS BLD VENIPUNCTURE: CPT | Mod: HCNC,PO

## 2020-03-19 NOTE — PATIENT INSTRUCTIONS
[email or web address]    Pelvic Tilt        Flatten back by tightening stomach muscles and buttocks.  Repeat 3 times per set. Do 1 sets per session. Do 2 sessions per day.     https://[employer]/134     Copyright © [employer]. All rights reserved.     Lower Trunk Rotation Stretch        Keeping back flat and feet together, rotate knees to left side. Hold 3 seconds.  Repeat 10 times per set. Do 2 sets per session. Do 2 sessions per day.     https://[employer]/122     Copyright © [employer]. All rights reserved.   Knee-to-Chest Stretch: Unilateral        With hand behind right knee, pull knee in to chest until a comfortable stretch is felt in lower back and buttocks. Keep back relaxed. Hold 20 seconds.  Repeat 3 times per set. Do 2 sets per session. Do 2 sessions per day.     https://[employer]/126     Copyright © [employer]. All rights reserved.     Bridging        Slowly raise buttocks from floor, keeping stomach tight.  Repeat 10 times per set. Do 2 sets per session. Do 2 sessions per day.     https://[employer]/1096     Copyright © [employer]. All rights reserved.     Clam Shell 45 Degrees        Lying with hips and knees bent 45°, one pillow between knees and ankles. Lift knee. Be sure pelvis does not roll backward. Do not arch back.  Do 10 times, each leg, 2 times per day.     [email or web address]

## 2020-03-19 NOTE — PLAN OF CARE
[facility]  Physical Therapy Initial Evaluation    Name: Dari Polanco  Clinic Number: [id number]    Therapy Diagnosis:   Encounter Diagnoses   Name Primary?    Spinal stenosis, unspecified spinal region     Post laminectomy syndrome     Chronic bilateral low back pain without sciatica      Physician: Wendy Ponce PA-C    Physician Orders: PT Eval and Treat   Back Comment - leg and core strenthening  Hip   Medical Diagnosis from Referral:   Spinal stenosis, unspecified spinal region [M48.00]  Post laminectomy syndrome     Evaluation Date: 3/19/2020  Authorization Period Expiration: 2021  Plan of Care Expiration: 2020  Visit # / Visits authorized: 1    Time In: 0700  Time Out: 0800  Total Billable Time: 60 minutes    Precautions: Standard    Subjective   Date of onset: 2020  History of current condition - Dari reports: having low back pain recently from early March after prolong standing/walking, performing home management tasks. No numbness/tinglin noted. No radicular pain but recalls having it in the past. No groin pain. No buttock pain. No falls noted.       Past Medical History:   Diagnosis Date    Arthritis     Bronchitis     Cancer     history of basal skin cancer status post excision    GERD (gastroesophageal reflux disease)     Obesity     Raynaud's syndrome     Retinal migraine     Tobacco dependence      Dari Polanco  has a past surgical history that includes Back surgery;  section, classic; Upper gastrointestinal endoscopy (?  (Rabito?)); and Colonoscopy (?  (Rabito?)).    Dari has a current medication list which includes the following prescription(s): aspirin, esomeprazole, meloxicam, and nifedipine.    Review of patient's allergies indicates:   Allergen Reactions    Codeine Itching    Penicillins Rash    Erythromycin Nausea And Vomiting    Lidocaine      'feels like I am on fire'        Imaging: MRI 2018    Status post L3-4 posterior treated fusion and decompressive laminectomy.  Advanced multilevel degenerative change, resulting in mild spinal canal narrowing and multilevel neuroforaminal narrowing ranging from mild to severe.    Prior Therapy: none noted  Social History:  lives with their family  Occupation: Retired teacher  Prior Level of Function: independent  Current Level of Function: modified independent, increase time noted  Recent or major surgery: none recently  Accidents: none noted    Pain:  Current 1/10, worst 8/10, best 0/10   Location: bilateral low back   Description: Aching, Dull and Variable  Aggravating Factors: Standing, Walking and Lifting  Easing Factors: pain medication and flexion     Night pain: yes  Is it positional? yes  Unexplained weight loss: no    Pts goals: Decrease pain to low back, work on balance. Increase strength to legs.    Objective   Posture: FHP, thoracic kyphosis  Palpation: point tender to bilateral lumbar paraspinals  Sensation: Dermatomes     Right Left Comment   L2 (lateral thigh) intact intact    L3 (medial thigh) intact intact    L4 (medial calf) intact intact    L5 (lateral calf) intact intact    S1 (lateral foot) intact intact    S2 (gastro/HS) intact intact    Saddle (cauda equina) intact intact      Myotomes:   Right Left Comment   Hip flexion (L2-3): 4-/5 4-/5    Knee extension (L3-4): 4/5 4+/5    DF (L4-5): 5/5 5/5    Great Toe Ext (L5-S1):   4+/5 5/5    Glut Medius (L5) 4-/5 4-/5    Great Toe Flex/HS (S1-S2) 5/5 5/5      DTR:   Right Left Comment   Patellar (L3-4) 1+ 1+    Achilles (S1) 1+ 1+        Thoracic/Lumbar AROM:     % limitation Pain/Dysfunction/movement   Flexion  (55-60 N)    25% Cannot touch toes  Non-uniform curvature     Extension (25 N)  50% UE does not maintain 170  ASIS does not clear toes  Lack of posterior weight shift     Special Tests:  -Repeated Flexion: better  -Repeated Ext: mild, production, limitation of extension noted    Bridge  "test/Endurance: (mean= 76.7 " compared to no low back pain, 172.9") +  SHip:       Lumbar:   SLR (with leg dominant pain)-  Slump-  Clonus-    GAIT: Dari ambulates 50 feet with no assistive device with independently.     GAIT DEVIATIONS: Dari displays flexed posturing, mild shuffling noted.    Transfers: Modified independent, increase time noted with sit to stands.    Pt/family was provided educational information, including: role of PT, goals for PT, scheduling - pt verbalized understanding. Discussed insurance limitations with pt.     CMS Impairment/Limitation/Restriction for FOTO Lumbar Spine Survey  Status Limitation G-Code CMS Severity Modifier  Intake 51% 49% Current Status CK - At least 40 percent but less than 60 percent  Predicted 58% 42% Goal Status+ CK - At least 40 percent but less than 60 percent       TREATMENT   Treatment Time In: 0850  Treatment Time Out: 0900  Total Treatment time separate from Evaluation: 5 minutes    Dari received therapeutic exercises to develop strength, endurance, ROM, flexibility, posture and core stabilization for 5 minutes including:  Seated: ergonomics  Supine: TA activation  Supine: clam shells  Supine: SKTC    Home Exercises and Patient Education Provided    Education provided:   - Yes    Written Home Exercises Provided: Patient instructed to cont prior HEP.  Exercises were reviewed and Dari was able to demonstrate them prior to the end of the session.  Dari demonstrated good  understanding of the education provided.     See EMR under Patient Instructions for exercises provided 3/19/2020.    Assessment   Dari is a 69 y.o. female referred to outpatient Physical Therapy with a medical diagnosis of Post laminectomy syndrome, spinal stenosis. Pt presents with postural imbalance, decrease lumbar mobility, hip/core weakness.    Problem List: pain, decreased ROM, decreased flexibility, decreased strength, decreased balance and stability, decreased motor control, antalgic " gait and inability to participate fully in vocation pursuits.    Pt prognosis is Good.   Pt will benefit from skilled outpatient Physical Therapy to address the deficits stated above and in the chart below, provide pt/family education, and to maximize pt's level of independence.     Plan of care discussed with patient: Yes  Pt's spiritual, cultural and educational needs considered and patient is agreeable to the plan of care and goals as stated below:     Anticipated Barriers for therapy: none    Medical Necessity is demonstrated by the following  History  Co-morbidities and personal factors that may impact the plan of care Co-morbidities:   high BMI and prior lumbar surgery    Personal Factors:   no deficits     moderate   Examination  Body Structures and Functions, activity limitations and participation restrictions that may impact the plan of care Body Regions:   back  lower extremities  upper extremities  trunk    Body Systems:    ROM  strength  gross coordinated movement  balance  gait  transfers  transitions  motor control    Participation Restrictions:   Home management  Community ambulation    Activity limitations:   Learning and applying knowledge  no deficits    General Tasks and Commands  no deficits    Communication  no deficits    Mobility  lifting and carrying objects  walking    Self care  no deficits    Domestic Life  doing house work (cleaning house, washing dishes, laundry)    Interactions/Relationships  no deficits    Life Areas  no deficits    Community and Social Life  no deficits         high   Clinical Presentation stable and uncomplicated low   Decision Making/ Complexity Score: low     Short Term GOALS:  In 4 weeks, pt. will:  - increase hip/knee MMT 1/2 grade for standing tolerance purposes.  - decrease outcome measure limitation to <45%  - improve thoracic extension by 25%/neutal for seated task purposes.  - demonstrate anterior loading independently for home management/balance  purposes    Long Term GOALS:  In 8 weeks, pt. will:  - be independent and compliant with HEP and SX management   - decrease outcome measure limitation to <40%  - return to home management independently with > 50% reduction in low back pain episodes.  - demonstrate hip MMT 4 or > for ADL purposes.    Plan   Plan of care Certification: 3/19/2020 to 05/15/2020  Outpatient Physical Therapy 2 times weekly for 8 weeks to include the following interventions: Gait Training, Manual Therapy, Moist Heat/ Ice, Neuromuscular Re-ed, Patient Education, Therapeutic Activites and Therapeutic Exercise.      Dari may at times be seen by a PTA as part of the Rehab Team.    Harsha Reeves, PT

## 2020-03-20 DIAGNOSIS — E53.8 B12 DEFICIENCY: Primary | ICD-10-CM

## 2020-03-20 RX ORDER — CYANOCOBALAMIN 1000 UG/ML
1000 INJECTION, SOLUTION INTRAMUSCULAR; SUBCUTANEOUS WEEKLY
Status: SHIPPED | OUTPATIENT
Start: 2020-04-02 | End: 2020-04-30

## 2020-03-20 RX ORDER — CYANOCOBALAMIN 1000 UG/ML
1000 INJECTION, SOLUTION INTRAMUSCULAR; SUBCUTANEOUS DAILY
Status: SHIPPED | OUTPATIENT
Start: 2020-03-20 | End: 2020-03-27

## 2020-03-20 RX ORDER — CYANOCOBALAMIN 1000 UG/ML
1000 INJECTION, SOLUTION INTRAMUSCULAR; SUBCUTANEOUS
Status: DISCONTINUED | OUTPATIENT
Start: 2020-05-07 | End: 2020-01-01 | Stop reason: ALTCHOICE

## 2020-03-23 ENCOUNTER — TELEPHONE (OUTPATIENT)
Dept: FAMILY MEDICINE | Facility: CLINIC | Age: 70
End: 2020-03-23

## 2020-03-23 DIAGNOSIS — R26.89 BALANCE DISORDER: Primary | ICD-10-CM

## 2020-03-23 NOTE — TELEPHONE ENCOUNTER
----- Message from Lu Hicks sent at 3/23/2020  9:43 AM CDT -----  Type:  Patient Returning Call    Who Called:  patient  Who Left Message for Patient:  Mallorie  Does the patient know what this is regarding?:  Lab results  Best Call Back Number:  [phone]  Additional Information:

## 2020-03-23 NOTE — TELEPHONE ENCOUNTER
Wendy ordered folate lab for this patient but in the order she put it as clinic collect instead of lab collect would you be able to fix it. I tried to pend the order. Not sure if its right though

## 2020-03-23 NOTE — TELEPHONE ENCOUNTER
Patient called back for results.  Patient notified of results. Patient stated understanding  Patient scheduled for nurse visit B-12 injection, scheduled for labs tomorrow

## 2020-03-24 ENCOUNTER — LAB VISIT (OUTPATIENT)
Dept: LAB | Facility: HOSPITAL | Age: 70
End: 2020-03-24
Attending: NURSE PRACTITIONER
Payer: MEDICARE

## 2020-03-24 ENCOUNTER — CLINICAL SUPPORT (OUTPATIENT)
Dept: FAMILY MEDICINE | Facility: CLINIC | Age: 70
End: 2020-03-24
Payer: MEDICARE

## 2020-03-24 VITALS
TEMPERATURE: 98 F | DIASTOLIC BLOOD PRESSURE: 64 MMHG | SYSTOLIC BLOOD PRESSURE: 122 MMHG | BODY MASS INDEX: 34.65 KG/M2 | HEART RATE: 74 BPM | WEIGHT: 195.56 LBS | OXYGEN SATURATION: 99 % | HEIGHT: 63 IN

## 2020-03-24 DIAGNOSIS — R26.89 BALANCE DISORDER: ICD-10-CM

## 2020-03-24 DIAGNOSIS — D69.6 THROMBOCYTOPENIA: ICD-10-CM

## 2020-03-24 DIAGNOSIS — R53.1 WEAKNESS: Primary | ICD-10-CM

## 2020-03-24 LAB
BASOPHILS # BLD AUTO: 0.08 K/UL (ref 0–0.2)
BASOPHILS NFR BLD: 1 % (ref 0–1.9)
DIFFERENTIAL METHOD: ABNORMAL
EOSINOPHIL # BLD AUTO: 0.1 K/UL (ref 0–0.5)
EOSINOPHIL NFR BLD: 1.3 % (ref 0–8)
ERYTHROCYTE [DISTWIDTH] IN BLOOD BY AUTOMATED COUNT: 13.7 % (ref 11.5–14.5)
FOLATE SERPL-MCNC: 5.1 NG/ML (ref 4–24)
HCT VFR BLD AUTO: 47.7 % (ref 37–48.5)
HGB BLD-MCNC: 14.6 G/DL (ref 12–16)
IMM GRANULOCYTES # BLD AUTO: 0.02 K/UL (ref 0–0.04)
IMM GRANULOCYTES NFR BLD AUTO: 0.3 % (ref 0–0.5)
LYMPHOCYTES # BLD AUTO: 2.3 K/UL (ref 1–4.8)
LYMPHOCYTES NFR BLD: 30.2 % (ref 18–48)
MCH RBC QN AUTO: 30.5 PG (ref 27–31)
MCHC RBC AUTO-ENTMCNC: 30.6 G/DL (ref 32–36)
MCV RBC AUTO: 100 FL (ref 82–98)
MONOCYTES # BLD AUTO: 0.6 K/UL (ref 0.3–1)
MONOCYTES NFR BLD: 7.7 % (ref 4–15)
NEUTROPHILS # BLD AUTO: 4.5 K/UL (ref 1.8–7.7)
NEUTROPHILS NFR BLD: 59.5 % (ref 38–73)
NRBC BLD-RTO: 0 /100 WBC
PLATELET # BLD AUTO: 108 K/UL (ref 150–350)
PMV BLD AUTO: 13.4 FL (ref 9.2–12.9)
RBC # BLD AUTO: 4.79 M/UL (ref 4–5.4)
WBC # BLD AUTO: 7.62 K/UL (ref 3.9–12.7)

## 2020-03-24 PROCEDURE — 99499 UNLISTED E&M SERVICE: CPT | Mod: HCNC,S$GLB,, | Performed by: PHYSICIAN ASSISTANT

## 2020-03-24 PROCEDURE — 99499 NO LOS: ICD-10-PCS | Mod: HCNC,S$GLB,, | Performed by: PHYSICIAN ASSISTANT

## 2020-03-24 PROCEDURE — 82746 ASSAY OF FOLIC ACID SERUM: CPT | Mod: HCNC

## 2020-03-24 PROCEDURE — 36415 COLL VENOUS BLD VENIPUNCTURE: CPT | Mod: HCNC,PO

## 2020-03-24 PROCEDURE — 96372 THER/PROPH/DIAG INJ SC/IM: CPT | Mod: HCNC,S$GLB,, | Performed by: PHYSICIAN ASSISTANT

## 2020-03-24 PROCEDURE — 99999 PR PBB SHADOW E&M-EST. PATIENT-LVL III: CPT | Mod: PBBFAC,HCNC,,

## 2020-03-24 PROCEDURE — 99999 PR PBB SHADOW E&M-EST. PATIENT-LVL III: ICD-10-PCS | Mod: PBBFAC,HCNC,,

## 2020-03-24 PROCEDURE — 96372 PR INJECTION,THERAP/PROPH/DIAG2ST, IM OR SUBCUT: ICD-10-PCS | Mod: HCNC,S$GLB,, | Performed by: PHYSICIAN ASSISTANT

## 2020-03-24 PROCEDURE — 85025 COMPLETE CBC W/AUTO DIFF WBC: CPT | Mod: HCNC

## 2020-03-24 NOTE — PATIENT INSTRUCTIONS
Patient informed to return to clinic tomorrow for her 2nd injection of B12. Patient stated verbal understanding.

## 2020-03-24 NOTE — PROGRESS NOTES
After obtaining consent, and per orders of Wendy NGUYEN, injection of cyanocobalamin (B12) given by Lo Way. Dose verified by Shauna Gillis LPN. Patient instructed to remain in clinic for 20 minutes afterwards, and to report any adverse reaction to me immediately. Injection was given in the Left Upper Outter Buttocks. Patient tolarated well.

## 2020-03-25 ENCOUNTER — TELEPHONE (OUTPATIENT)
Dept: FAMILY MEDICINE | Facility: CLINIC | Age: 70
End: 2020-03-25

## 2020-03-25 ENCOUNTER — CLINICAL SUPPORT (OUTPATIENT)
Dept: FAMILY MEDICINE | Facility: CLINIC | Age: 70
End: 2020-03-25
Payer: MEDICARE

## 2020-03-25 DIAGNOSIS — E53.8 B12 DEFICIENCY: Primary | ICD-10-CM

## 2020-03-25 PROCEDURE — 99211 PR OFFICE/OUTPT VISIT, EST, LEVL I: ICD-10-PCS | Mod: HCNC,S$GLB,, | Performed by: FAMILY MEDICINE

## 2020-03-25 PROCEDURE — 99211 OFF/OP EST MAY X REQ PHY/QHP: CPT | Mod: HCNC,S$GLB,, | Performed by: FAMILY MEDICINE

## 2020-03-25 PROCEDURE — 96372 THER/PROPH/DIAG INJ SC/IM: CPT | Mod: HCNC,S$GLB,, | Performed by: PHYSICIAN ASSISTANT

## 2020-03-25 PROCEDURE — 96372 PR INJECTION,THERAP/PROPH/DIAG2ST, IM OR SUBCUT: ICD-10-PCS | Mod: HCNC,S$GLB,, | Performed by: PHYSICIAN ASSISTANT

## 2020-03-25 RX ADMIN — CYANOCOBALAMIN 1000 MCG: 1000 INJECTION, SOLUTION INTRAMUSCULAR; SUBCUTANEOUS at 09:03

## 2020-03-25 NOTE — TELEPHONE ENCOUNTER
----- Message from Tash Arambula sent at 3/25/2020 11:03 AM CDT -----  Contact: self  Type:  Patient Returning Call    Who Called:  patient  Who Left Message for Patient:  Shauna or Linda  Does the patient know what this is regarding?:  Wanting to know about the labs that were ordered by Wendy Hamm on 3/19 and these are the reason she is on the B12 shots, and then more labs yesterday 3/24 and she has not heard the results and the reason for the call   Best Call Back Number:  [phone] (home)   Additional Information:  na

## 2020-03-26 ENCOUNTER — CLINICAL SUPPORT (OUTPATIENT)
Dept: FAMILY MEDICINE | Facility: CLINIC | Age: 70
End: 2020-03-26
Payer: MEDICARE

## 2020-03-26 VITALS — BODY MASS INDEX: 34.72 KG/M2 | WEIGHT: 196 LBS

## 2020-03-26 DIAGNOSIS — E53.8 B12 DEFICIENCY: Primary | ICD-10-CM

## 2020-03-26 PROCEDURE — 99999 PR PBB SHADOW E&M-EST. PATIENT-LVL I: CPT | Mod: PBBFAC,HCNC,,

## 2020-03-26 PROCEDURE — 99499 NO LOS: ICD-10-PCS | Mod: HCNC,S$GLB,, | Performed by: FAMILY MEDICINE

## 2020-03-26 PROCEDURE — 99999 PR PBB SHADOW E&M-EST. PATIENT-LVL I: ICD-10-PCS | Mod: PBBFAC,HCNC,,

## 2020-03-26 PROCEDURE — 96372 THER/PROPH/DIAG INJ SC/IM: CPT | Mod: HCNC,S$GLB,, | Performed by: PHYSICIAN ASSISTANT

## 2020-03-26 PROCEDURE — 99499 UNLISTED E&M SERVICE: CPT | Mod: HCNC,S$GLB,, | Performed by: FAMILY MEDICINE

## 2020-03-26 PROCEDURE — 96372 PR INJECTION,THERAP/PROPH/DIAG2ST, IM OR SUBCUT: ICD-10-PCS | Mod: HCNC,S$GLB,, | Performed by: PHYSICIAN ASSISTANT

## 2020-03-26 RX ADMIN — CYANOCOBALAMIN 1000 MCG: 1000 INJECTION, SOLUTION INTRAMUSCULAR; SUBCUTANEOUS at 09:03

## 2020-03-26 NOTE — PROGRESS NOTES
After obtaining consent, and per orders of Dr. ROJAS, injection of CYANCOBALAMIN given by Gabo Olsen. Patient instructed to remain in clinic for 20 minutes afterwards, and to report any adverse reaction to me immediately.

## 2020-03-27 ENCOUNTER — CLINICAL SUPPORT (OUTPATIENT)
Dept: FAMILY MEDICINE | Facility: CLINIC | Age: 70
End: 2020-03-27
Payer: MEDICARE

## 2020-03-27 DIAGNOSIS — E53.8 B12 DEFICIENCY: Primary | ICD-10-CM

## 2020-03-27 PROCEDURE — 96372 PR INJECTION,THERAP/PROPH/DIAG2ST, IM OR SUBCUT: ICD-10-PCS | Mod: HCNC,S$GLB,, | Performed by: FAMILY MEDICINE

## 2020-03-27 PROCEDURE — 99999 PR PBB SHADOW E&M-EST. PATIENT-LVL I: ICD-10-PCS | Mod: PBBFAC,HCNC,,

## 2020-03-27 PROCEDURE — 99999 PR PBB SHADOW E&M-EST. PATIENT-LVL I: CPT | Mod: PBBFAC,HCNC,,

## 2020-03-27 PROCEDURE — 96372 THER/PROPH/DIAG INJ SC/IM: CPT | Mod: HCNC,S$GLB,, | Performed by: FAMILY MEDICINE

## 2020-03-27 RX ORDER — CYANOCOBALAMIN 1000 UG/ML
1000 INJECTION, SOLUTION INTRAMUSCULAR; SUBCUTANEOUS ONCE
Status: COMPLETED | OUTPATIENT
Start: 2020-03-27 | End: 2020-03-27

## 2020-03-27 RX ORDER — CYANOCOBALAMIN 1000 UG/ML
100 INJECTION, SOLUTION INTRAMUSCULAR; SUBCUTANEOUS
Status: CANCELLED | OUTPATIENT
Start: 2020-03-27 | End: 2020-03-27

## 2020-03-27 RX ADMIN — CYANOCOBALAMIN 1000 MCG: 1000 INJECTION, SOLUTION INTRAMUSCULAR; SUBCUTANEOUS at 09:03

## 2020-03-30 ENCOUNTER — CLINICAL SUPPORT (OUTPATIENT)
Dept: FAMILY MEDICINE | Facility: CLINIC | Age: 70
End: 2020-03-30
Payer: MEDICARE

## 2020-03-30 ENCOUNTER — TELEPHONE (OUTPATIENT)
Dept: FAMILY MEDICINE | Facility: CLINIC | Age: 70
End: 2020-03-30

## 2020-03-30 DIAGNOSIS — E53.8 B12 DEFICIENCY: Primary | ICD-10-CM

## 2020-03-30 PROCEDURE — 96372 PR INJECTION,THERAP/PROPH/DIAG2ST, IM OR SUBCUT: ICD-10-PCS | Mod: HCNC,S$GLB,, | Performed by: FAMILY MEDICINE

## 2020-03-30 PROCEDURE — 96372 THER/PROPH/DIAG INJ SC/IM: CPT | Mod: HCNC,S$GLB,, | Performed by: FAMILY MEDICINE

## 2020-03-30 PROCEDURE — 99999 PR PBB SHADOW E&M-EST. PATIENT-LVL I: CPT | Mod: PBBFAC,HCNC,,

## 2020-03-30 PROCEDURE — 99999 PR PBB SHADOW E&M-EST. PATIENT-LVL I: ICD-10-PCS | Mod: PBBFAC,HCNC,,

## 2020-03-30 RX ORDER — CYANOCOBALAMIN 1000 UG/ML
1000 INJECTION, SOLUTION INTRAMUSCULAR; SUBCUTANEOUS DAILY
Status: COMPLETED | OUTPATIENT
Start: 2020-03-30 | End: 2020-03-31

## 2020-03-30 RX ADMIN — CYANOCOBALAMIN 1000 MCG: 1000 INJECTION, SOLUTION INTRAMUSCULAR; SUBCUTANEOUS at 10:03

## 2020-03-30 NOTE — TELEPHONE ENCOUNTER
----- Message from Tash Arambula sent at 3/30/2020  8:40 AM CDT -----  Contact: self  Patient has a nurse visit scheduled for today and tomorrow along with an appt in April and she wants to reschedule all of them. Please call back at [phone] (home) to advise. Thanks

## 2020-03-30 NOTE — TELEPHONE ENCOUNTER
Patient has 2 more days to receive the B-12 injection, Today and Tomorrow. But the order ended Thursday that Wendy put in. Can you please put in an order for clinic administered B-12 injection in  Wendy Kris gomez. Please advise

## 2020-03-31 ENCOUNTER — CLINICAL SUPPORT (OUTPATIENT)
Dept: FAMILY MEDICINE | Facility: CLINIC | Age: 70
End: 2020-03-31
Payer: MEDICARE

## 2020-03-31 VITALS — BODY MASS INDEX: 34.72 KG/M2 | WEIGHT: 196 LBS

## 2020-03-31 DIAGNOSIS — E53.8 B12 DEFICIENCY: Primary | ICD-10-CM

## 2020-03-31 PROCEDURE — 99999 PR PBB SHADOW E&M-EST. PATIENT-LVL II: ICD-10-PCS | Mod: PBBFAC,HCNC,,

## 2020-03-31 PROCEDURE — 96372 THER/PROPH/DIAG INJ SC/IM: CPT | Mod: HCNC,S$GLB,, | Performed by: FAMILY MEDICINE

## 2020-03-31 PROCEDURE — 99999 PR PBB SHADOW E&M-EST. PATIENT-LVL II: CPT | Mod: PBBFAC,HCNC,,

## 2020-03-31 PROCEDURE — 96372 PR INJECTION,THERAP/PROPH/DIAG2ST, IM OR SUBCUT: ICD-10-PCS | Mod: HCNC,S$GLB,, | Performed by: FAMILY MEDICINE

## 2020-03-31 RX ADMIN — CYANOCOBALAMIN 1000 MCG: 1000 INJECTION, SOLUTION INTRAMUSCULAR; SUBCUTANEOUS at 09:03

## 2020-03-31 NOTE — TELEPHONE ENCOUNTER
Manuel Kuo. Ms. Chapin came in for her B12 this morning and wanted to know if it would be okay for her to take OTC B12 vitamins as well as continue to get her injections. She also wanted to know if she will be able to come in next week to get her injection. Schedules are blocked until April 17th.

## 2020-03-31 NOTE — PROGRESS NOTES
After obtaining consent, and per orders of Dr. Ponce, injection of B12 given by Etta Mcdowell. Patient instructed to remain in clinic for 20 minutes afterwards, and to report any adverse reaction to me immediately.

## 2020-03-31 NOTE — TELEPHONE ENCOUNTER
She can start taking B12 by mouth daily until April the 17th so she does not have to come to the office for the injections.  And then we can resume the injections so the patient can come back for those after April 17.  Thank you

## 2020-04-09 ENCOUNTER — TELEPHONE (OUTPATIENT)
Dept: FAMILY MEDICINE | Facility: CLINIC | Age: 70
End: 2020-04-09

## 2020-04-09 NOTE — TELEPHONE ENCOUNTER
----- Message from Princess SPENSER Garland sent at 4/9/2020 10:39 AM CDT -----  Contact: pt  Type:  Patient Returning Call    Who Called:  Patient  Who Left Message for Patient:  Nurse Gabo  Does the patient know what this is regarding?:  yes  Best Call Back Number:    Additional Information:  Patient has some questions

## 2020-04-09 NOTE — TELEPHONE ENCOUNTER
Spoke to pt. Pt states that she someone told her that she could be a carrier of the virus even if she is not showing any symptoms and pt is worried about giving it to someone else. Advised pt to just practice social distancing and hand hygiene. Pt verbalized understanding.

## 2020-06-15 NOTE — PROGRESS NOTES
"The patient presents today for general health evaluation and counseling.  She was found to be deficient in B12 at her last visit.  She has been taking B12 injections and has not is a great improvement in her energy level.    She stopped her physical therapy because of COVID and would like to restart physical therapy.  She has a history of lumbar spinal stenosis.  She fell physical therapy was also helping her a lot with her strengthening of arms and legs.       Past Medical History:  Past Medical History:   Diagnosis Date    Arthritis     Bronchitis     Cancer     history of basal skin cancer status post excision    GERD (gastroesophageal reflux disease)     Obesity     Raynaud's syndrome     Retinal migraine     Tobacco dependence      Past Surgical History:   Procedure Laterality Date    BACK SURGERY       SECTION, CLASSIC      COLONOSCOPY  2006?  (Rabito?)    "Normal"    UPPER GASTROINTESTINAL ENDOSCOPY  ?  (Rabito?)    "Acid reflux"     Review of patient's allergies indicates:   Allergen Reactions    Codeine Itching    Penicillins Rash    Erythromycin Nausea And Vomiting    Lidocaine      'feels like I am on fire'     Current Outpatient Medications on File Prior to Visit   Medication Sig Dispense Refill    aspirin (ECOTRIN) 81 MG EC tablet Take 81 mg by mouth once daily.      cyanocobalamin (VITAMIN B-12) 1000 MCG tablet Take 100 mcg by mouth once daily.      [DISCONTINUED] meloxicam (MOBIC) 7.5 MG tablet TAKE 1 TABLET(7.5 MG) BY MOUTH EVERY DAY (Patient taking differently: TAKE 1 TABLET(7.5 MG) BY MOUTH EVERY DAY AS NEEDED) 90 tablet 2    [DISCONTINUED] NIFEdipine (ADALAT CC) 30 MG TbSR TAKE 1 TABLET(30 MG) BY MOUTH EVERY DAY 90 tablet 2    [DISCONTINUED] esomeprazole (NEXIUM) 20 MG capsule Take 20 mg by mouth before breakfast.       Current Facility-Administered Medications on File Prior to Visit   Medication Dose Route Frequency Provider Last Rate Last Dose    cyanocobalamin " injection 1,000 mcg  1,000 mcg Intramuscular Q30 Days Wendy Ponce PA-C         Social History     Socioeconomic History    Marital status: Single     Spouse name: Not on file    Number of children: Not on file    Years of education: Not on file    Highest education level: Not on file   Occupational History    Not on file   Social Needs    Financial resource strain: Not on file    Food insecurity     Worry: Not on file     Inability: Not on file    Transportation needs     Medical: Not on file     Non-medical: Not on file   Tobacco Use    Smoking status: Current Every Day Smoker     Packs/day: 0.25     Years: 36.00     Pack years: 9.00     Types: Cigarettes    Smokeless tobacco: Never Used    Tobacco comment: 1/2 ppd   Substance and Sexual Activity    Alcohol use: No     Alcohol/week: 0.0 standard drinks    Drug use: Not on file    Sexual activity: Not on file   Lifestyle    Physical activity     Days per week: Not on file     Minutes per session: Not on file    Stress: Not on file   Relationships    Social connections     Talks on phone: Not on file     Gets together: Not on file     Attends [employer] service: Not on file     Active member of club or organization: Not on file     Attends meetings of clubs or organizations: Not on file     Relationship status: Not on file   Other Topics Concern    Not on file   Social History Narrative    Not on file     Family History   Problem Relation Age of Onset    Esophageal cancer Father     Cancer Mother         'spinal column'    Breast cancer Mother     Diabetes Maternal Grandmother     Heart disease Maternal Grandfather     Breast cancer Sister          ROS:GENERAL: No fever, chills, fatigability or weight loss.  SKIN: No rashes, itching or changes in color or texture of skin.  HEAD: No headaches or recent head trauma.EYES: Visual acuity fine. No photophobia, ocular pain or diplopia.EARS: Denies ear pain, discharge or vertigo.NOSE: No loss of  smell, no epistaxis or postnasal drip.MOUTH & THROAT: No hoarseness or change in voice. No excessive gum bleeding.NODES: Denies swollen glands.  CHEST: Denies ANDREWS, cyanosis, wheezing, cough and sputum production.  CARDIOVASCULAR: Denies chest pain, PND, orthopnea or reduced exercise tolerance.  ABDOMEN: Appetite fine. No weight loss. Denies diarrhea, abdominal pain, hematemesis or blood in stool.  URINARY: No flank pain, dysuria or hematuria.  PERIPHERAL VASCULAR: No claudication or cyanosis.  MUSCULOSKELETAL: See above.  NEUROLOGIC: No history of seizures, paralysis, alteration of gait or coordination.    PE:   HEAD: Normocephalic, atraumatic.EYES: PERRL. EOMI.   EARS: TM's intact. Light reflex normal. No retraction or perforation.   NOSE: Mucosa pink. Airway clear.MOUTH & THROAT: No tonsillar enlargement. No pharyngeal erythema or exudate. No stridor.  NODES: No cervical, axillary or inguinal lymph node enlargement.  CHEST: Lungs clear to auscultation.  CARDIOVASCULAR: Normal S1, S2. No rubs, murmurs or gallops.  ABDOMEN: Bowel sounds normal. Not distended. Soft. No tenderness or masses.  MUSCULOSKELETAL: No palpable abnormality  NEUROLOGIC: Cranial Nerves: II-XII grossly intact.  Motor: 5/5 strength major flexors/extensors.  DTR's: Knees, Ankles 2+ and equal bilaterally; downgoing toes.  Sensory: Intact to light touch distally.  Gait & Posture: Normal gait and fine motion. No cerebellar signs.     Impression:Routine health check  Plan:Lab eval  Rec diet and ex recs  Rev age appropriate screenings        Screening for breast cancer  -     Mammo Digital Screening Bilateral With CAD; Future; Expected date: 06/15/2020    Encounter for screening mammogram for malignant neoplasm of breast   -     Mammo Digital Screening Bilateral With CAD; Future; Expected date: 06/15/2020    Spinal stenosis, unspecified spinal region  -     Ambulatory referral/consult to Physical/Occupational Therapy; Future; Expected date:  06/22/2020    Decreased muscle strength  - restart physical therapy    DDD (degenerative disc disease), lumbar  -     meloxicam (MOBIC) 7.5 MG tablet; Take 1 tablet (7.5 mg total) by mouth daily as needed for Pain. TAKE 1 TABLET(7.5 MG) BY MOUTH EVERY DAY AS NEEDED  Dispense: 20 tablet; Refill: 1    DDD (degenerative disc disease), cervical  - restart physical therapy     Raynaud's disease without gangrene  -     NIFEdipine (ADALAT CC) 30 MG TbSR; TAKE 1 TABLET(30 MG) BY MOUTH EVERY DAY  Dispense: 90 tablet; Refill: 3    Gastroesophageal reflux disease, esophagitis presence not specified  -     esomeprazole (NEXIUM) 20 MG capsule; Take 1 capsule (20 mg total) by mouth before breakfast.  Dispense: 90 capsule; Refill: 3

## 2020-06-29 NOTE — PLAN OF CARE
"  [facility]  Physical Therapy Initial Evaluation    Name: Dari Polanco  Clinic Number: [id number]    Therapy Diagnosis:   Encounter Diagnoses   Name Primary?    Spinal stenosis, unspecified spinal region     Chronic bilateral low back pain without sciatica Yes     Physician: Wendy Ponce PA-C    Physician Orders: PT Eval and Treat , leg and core strengthening from previous order as well  Medical Diagnosis from Referral:   Spinal stenosis, unspecified spinal region     Evaluation Date: 2020  Authorization Period Expiration: 2020  Plan of Care Expiration: 2020  Visit # / Visits authorized: 1    Time In: 1115 (15 minutes late)  Time Out: 1145  Total Billable Time: 30 minutes    Precautions: Standard    Subjective   Date of onset: 06/15/2020  History of current condition - Dari reports: having ongoing low back pain who was coming in 2020 with 2 weeks with success/decreaed low back pain. Patient reported having 2 weeks of relief and then "got lazy". Low back pain, intermittent noted. No radicular pain noted. No falls noted.       Past Medical History:   Diagnosis Date    Arthritis     Bronchitis     Cancer     history of basal skin cancer status post excision    GERD (gastroesophageal reflux disease)     Obesity     Raynaud's syndrome     Retinal migraine     Tobacco dependence      Dari Polanco  has a past surgical history that includes Back surgery;  section, classic; Upper gastrointestinal endoscopy (?  ([place]?)); and Colonoscopy (?  ([place]?)).    Dari has a current medication list which includes the following prescription(s): aspirin, cyanocobalamin, esomeprazole, meloxicam, and nifedipine, and the following Facility-Administered Medications: cyanocobalamin.    Review of patient's allergies indicates:   Allergen Reactions    Codeine Itching    Penicillins Rash    Erythromycin Nausea And Vomiting    Lidocaine      'feels " like I am on fire'      Imaging: MRI 6/18/2018  (past)  Status post L3-4 posterior treated fusion and decompressive laminectomy.  Advanced multilevel degenerative change, resulting in mild spinal canal narrowing and multilevel neuroforaminal narrowing ranging from mild to severe.     Prior Therapy: none noted  Social History:  lives with their family  Occupation: Retired teacher  Prior Level of Function: independent  Current Level of Function: modified independent, increase time noted  Recent or major surgery: none recently  Accidents: none noted     Pain:  Current 3/10, worst 8/10, best 0/10   Location: bilateral low back   Description: Aching, Dull and Variable  Aggravating Factors: Standing, Walking and Lifting  Easing Factors: pain medication and flexion      Night pain: yes  Is it positional? yes  Unexplained weight loss: no     Pts goals: Decrease ow back pain and do more activities in the house.     Objective   Posture: cervical protrusion, horacic kyphosis  Palpation: point tender to bilateral lumbar paraspinals  Sensation: Dermatomes       Right Left Comment   L2 (lateral thigh) intact intact     L3 (medial thigh) intact intact     L4 (medial calf) intact intact     L5 (lateral calf) intact intact     S1 (lateral foot) intact intact     S2 (gastro/HS) intact intact     Saddle (cauda equina) intact intact        Myotomes:    Right Left Comment   Hip flexion (L2-3): 4-/5 4/5     Knee extension (L3-4): 4/5 4+/5     DF (L4-5): 5/5 5/5     Great Toe Ext (L5-S1):    4+/5 5/5     Glut Medius (L5) 4-/5 4-/5     Great Toe Flex/HS (S1-S2) 5/5 5/5        DTR:    Right Left Comment   Patellar (L3-4) 1+ 1+     Achilles (S1) 1+ 1+           Thoracic/Lumbar AROM:     % limitation Pain/Dysfunction/movement   Flexion  (55-60 N)    25% Cannot touch toes  Non-uniform curvature      Extension (25 N)  50% UE does not maintain 170  ASIS does not clear toes  Lack of posterior weight shift      Special Tests:  -Repeated Flexion:  "decreased, better  -Repeated Ext: produce/NW     Bridge test/Endurance: (mean= 76.7 " compared to no low back pain, 172.9") +  SHip:        Lumbar:   SLR (with leg dominant pain)-  Slump-  Clonus-     GAIT: Dari ambulates 50 feet with no assistive device independently.      GAIT DEVIATIONS: Dari displays flexed posturing, mild shuffling noted, NBOS.     Transfers:   Modified independent, increase time noted with sit to stands, UE support.     CMS Impairment/Limitation/Restriction for FOTO Lumbar Spine Survey  Status Limitation G-Code CMS Severity Modifier  Intake 38% 62% Current Status CL - At least 60 percent but less than 80 percent  Predicted 63% 37% Goal Status+ CJ - At least 20 percent but less than 40 percent       TREATMENT   Treatment Time In: 1140  Treatment Time Out: 1145  Total Treatment time separate from Evaluation: 5 minutes    Dari received therapeutic exercises to develop strength, endurance, ROM, flexibility, posture and core stabilization for 5 minutes including:  Seated: ergonomics  Standing: heel rise, hip abduction    Home Exercises and Patient Education Provided    Education provided:   - Yes    Written Home Exercises Provided: continue with previous HEP and added standing TE.  Exercises were reviewed and Dari was able to demonstrate them prior to the end of the session.  Dari demonstrated good  understanding of the education provided.     See EMR under Patient Instructions for exercises provided 6/29/2020.    Assessment   Dari is a 69 y.o. female referred to outpatient Physical Therapy with a medical diagnosis of spinal stenosis. Pt presents with right sided low back pain > left side, intermittent, localized noted. Thoracic kyphosis noted, decreased postural awareness.    Problem List: pain, decreased ROM, decreased flexibility, decreased strength, decreased balance and stability, decreased motor control, antalgic gait and inability to participate fully in vocation pursuits.    Pt " "prognosis is Good.   Pt will benefit from skilled outpatient Physical Therapy to address the deficits stated above and in the chart below, provide pt/family education, and to maximize pt's level of independence.     Plan of care discussed with patient: Yes  Pt's spiritual, cultural and educational needs considered and patient is agreeable to the plan of care and goals as stated below:     Anticipated Barriers for therapy: none    Medical Necessity is demonstrated by the following  History  Co-morbidities and personal factors that may impact the plan of care Co-morbidities:   high BMI and prior lumbar surgery    Personal Factors:   no deficits     moderate   Examination  Body Structures and Functions, activity limitations and participation restrictions that may impact the plan of care Body Regions:   back  lower extremities  upper extremities  trunk    Body Systems:    ROM  strength  balance  gait  transfers  transitions  motor control    Participation Restrictions:   Home management    Activity limitations:   Learning and applying knowledge  no deficits    General Tasks and Commands  no deficits    Communication  no deficits    Mobility  lifting and carrying objects  walking    Self care  no deficits    Domestic Life  doing house work (cleaning house, washing dishes, laundry)    Interactions/Relationships  no deficits    Life Areas  no deficits    Community and Social Life  no deficits         high   Clinical Presentation stable and uncomplicated low   Decision Making/ Complexity Score: low     Short Term GOALS:  In 4 weeks, pt. will:  - report 25% reduction on low back pain episodes with home management tasks  - decrease outcome measure limitation to <60%  - improve hip/core MMT 1/2 grade for ambulatory purposes  - demonstrate seated posture with good mechanics for reading purposes.  - demonstrate sit to stands up to 5 times in 30" for mobility purposes.    Long Term GOALS:  In 8 weeks, pt. will:  - be independent " "and compliant with HEP and SX management   - decrease outcome measure limitation to <50%  - demonstrate hip/core MMT 4 or > for ADL purposes.  - demonstrate sit to stands up to 8 times in 30" for home management purposes.    Plan   Plan of care Certification: 6/29/2020 to 08/29/2020.  Outpatient Physical Therapy 2 times weekly for 8 weeks to include the following interventions: Gait Training, Manual Therapy, Moist Heat/ Ice, Neuromuscular Re-ed, Patient Education, Therapeutic Activites and Therapeutic Exercise.      Dari may at times be seen by a PTA as part of the Rehab Team.    Harsha Reeves, PT      "

## 2020-06-29 NOTE — PATIENT INSTRUCTIONS
http://blog.[employer].com/wp-content/uploads/2017/06/correct-posture-p.png    http://blog.[employer]/wp-content/uploads/2017/06/correct-posture-p.png    Pelvic Tilt        Flatten back by tightening stomach muscles and buttocks.  Repeat 3 times per set. Do 1 sets per session. Do 2 sessions per day.     https://[email or web address]     Copyright © [employer]. All rights reserved.     Lower Trunk Rotation Stretch        Keeping back flat and feet together, rotate knees to left side. Hold 3 seconds.  Repeat 10 times per set. Do 2 sets per session. Do 2 sessions per day.     [email or web address]     Copyright © [employer]. All rights reserved.   Knee-to-Chest Stretch: Unilateral        With hand behind right knee, pull knee in to chest until a comfortable stretch is felt in lower back and buttocks. Keep back relaxed. Hold 20 seconds.  Repeat 3 times per set. Do 2 sets per session. Do 2 sessions per day.     [email or web address]     Copyright © [employer]. All rights reserved.     Bridging        Slowly raise buttocks from floor, keeping stomach tight.  Repeat 10 times per set. Do 2 sets per session. Do 2 sessions per day.     https://[email or web address]     Copyright © [employer]. All rights reserved.     Clam Shell 45 Degrees        Lying with hips and knees bent 45°, one pillow between knees and ankles. Lift knee. Be sure pelvis does not roll backward. Do not arch back.  Do 10 times, each leg, 2 times per day.     [email or web address]

## 2020-07-01 NOTE — PROGRESS NOTES
"  Physical Therapy Daily Treatment Note     Name: Dari Polanco  Clinic Number: [id number]    Therapy Diagnosis:   Encounter Diagnosis   Name Primary?    Chronic bilateral low back pain without sciatica Yes     Physician: Wendy Ponce PA-C    Visit Date: 7/1/2020  Physician Orders: PT Eval and Treat , leg and core strengthening from previous order as well  Medical Diagnosis from Referral:   Spinal stenosis, unspecified spinal region      Evaluation Date: 6/29/2020  Authorization Period Expiration: 09/20/2020  Plan of Care Expiration: 08/28/2020  Visit # / Visits authorized: 2     Time In: 0915  Time Out: 1000  Total Billable Time: 40 minutes     Precautions: Standard    Subjective     Pt reports: doing well with no new s/s.  She was compliant with home exercise program.  Response to previous treatment: muscle soreness  Functional change: Too soon to tell    Pain: 0/10  Location: bilateral low back      Objective       Dari received therapeutic exercises to develop strength, endurance, ROM, flexibility, posture and core stabilization for 40 minutes including:    Thoracic/Lumbar AROM:     % limitation Pain/Dysfunction/movement   Flexion  (55-60 N)    25% Cannot touch toes  Non-uniform curvature      Extension (25 N)  50% UE does not maintain 170  ASIS does not clear toes  Lack of posterior weight shift      Special Tests:  -Repeated Flexion: decreased, better  -Repeated Ext: produce/NW    Bridge test/Endurance: (mean= 76.7 " compared to no low back pain, 172.9") +      Seated: postural correction  Seated: scapular retraction with red band 2/10  Seated: biceps curls with red band 2/10  Seated: thoracic extension with ball/bio-feed back x 20    Supine:  Ball squeezes 2/10, 3" hold  Clam shells with red band x 20  TA activation with red band x 20    Home Exercises Provided and Patient Education Provided     Education provided:   - Yes    Written Home Exercises Provided: Patient instructed to cont prior " "HEP.  Exercises were reviewed and Dari was able to demonstrate them prior to the end of the session.  Dari demonstrated good  understanding of the education provided.     See EMR under Patient Instructions for exercises provided prior visit.    Assessment   Cueing on posture in standing and seated position. Good tolerance with TE progression.    Dari is progressing well towards her goals.   Pt prognosis is Fair.     Pt will continue to benefit from skilled outpatient physical therapy to address the deficits listed in the problem list box on initial evaluation, provide pt/family education and to maximize pt's level of independence in the home and community environment.     Pt's spiritual, cultural and educational needs considered and pt agreeable to plan of care and goals.    Anticipated barriers to physical therapy: none    Goals:   In 4 weeks, pt. will:  - report 25% reduction on low back pain episodes with home management tasks  - decrease outcome measure limitation to <60%  - improve hip/core MMT 1/2 grade for ambulatory purposes  - demonstrate seated posture with good mechanics for reading purposes.  - demonstrate sit to stands up to 5 times in 30" for mobility purposes.     Long Term GOALS:  In 8 weeks, pt. will:  - be independent and compliant with HEP and SX management   - decrease outcome measure limitation to <50%  - demonstrate hip/core MMT 4 or > for ADL purposes.  - demonstrate sit to stands up to 8 times in 30" for home management purposes      Plan   Continue with POC.    Harsha Reeves, PT    "

## 2020-07-06 NOTE — TELEPHONE ENCOUNTER
Spoke wit pt scheduled an appointment wit a provider. Pt verbalized understanding phone call ended.

## 2020-07-06 NOTE — TELEPHONE ENCOUNTER
----- Message from Deirdre Valdivia sent at 7/6/2020  3:04 PM CDT -----  Contact: Dari [phone]  Patient is calling to follow up on her earlier call about medication for head and body lice.  Her pharmacy is:  [id number] - [address] AT [facility] [address] [address]  Phone: [phone] Fax: [phone]    She would like to get that today.      Her phone number is [phone].  Thank you!

## 2020-07-07 NOTE — PATIENT INSTRUCTIONS
Head Lice     Head lice can spread quickly in schools and  centers.     Lice are very tiny insects. They like to live in hair, so they are often called head lice. An infection with head lice is very common in school-age children, but anyone can get lice. Head lice do not live on pets and cannot jump, fly, or walk on the ground. But they easily pass from child to child through close contact and on clothes, bed linens, brushes and ocampo, hats, and toys. Head lice infections are not dangerous, but they can be difficult to treat sometimes. They are very contagious and should be treated right away to stop infection from spreading.  Symptoms of Head Lice  Lice are so small and fast-moving that they are hard to see. Head lice lay eggs, called nits. Nits are very small, silvery white, and teardrop shaped. They often can be found stuck to the hair near the scalp, behind the ears, and at the hairline on the back of the neck. Other signs of head lice may include:  · Itching of the scalp and scalp irritation.  · A tickling feeling of something moving through the hair.  · Sores on the scalp caused by scratching.  · Swollen glands at the back of the neck caused by infected bites.  Treating Head Lice  · Ask your healthcare provider or pharmacist to recommend a shampoo, cream, or lotion to stop lice infestation. Follow the instructions on the product for how to use it.  · Comb the nits out of your childs hair with a special comb that comes with the product or is recommended by your healthcare provider or pharmacist. Rinsing the hair with distilled white vinegar can make nits easier to see.  · After a week, check the child for more nits. Follow the products directions and ask your healthcare provider about the need for repeating treatment.  · Check other household members for lice.  · Wash your childs towels, clothing, bed linens, cloth toys, and other personal items in hot soapy water. Dry them on high heat.  · Wash  all the childs ocampo and brushes in very hot, soapy water.  · For items that cant be washed, seal them in plastic bags for 2 weeks.  · Vacuum floors and furniture. Throw the vacuum bag away afterward.  · Notify your childs school and caregivers so that other children can be checked.  · Keep your child home from  or school until the morning after treatment for lice.  · Do not spray your house with chemicals or pesticides. These can be dangerous to your familys health.  When to call the healthcare provider  Do not treat your child for head lice unless you are sure the child has them. Because lice are insects, most products to get rid of them have pesticides in them. You should not expose your child to these chemicals unless it is necessary since they can cause skin and eye irritation. Call your childs healthcare provider if:  · Youre not sure whether your child has lice.  · Your child is younger than age 2.  · Treatment doesnt get rid of the lice.  · Your child has infected sores that get worse or dont heal.  · Your child is itchy or scratching in areas other than the scalp.  · You have questions about your childs illness or treatment.  Prevention  To help prevent the spread of head lice:  · Warn your children not to share brushes, hats, and clothes with other children.  · Have your child avoid physical contact with anyone who has head lice until after the person has been treated.  · Examine your child when he or she has come in close contact with a person infected with lice.  Note: It may take up to a week after treatment for your childs itching to stop. Your healthcare provider may recommend that you repeat treatment a week later, but your child can return to school or  the day after treatment.   Date Last Reviewed: 8/1/2016  © 4698-7052 [employer]. [address] [place] [address], [place] [address]. All rights reserved. This information is not intended as a substitute for professional  medical care. Always follow your healthcare professional's instructions.

## 2020-08-10 NOTE — TELEPHONE ENCOUNTER
----- Message from Giovanny Bowles sent at 8/10/2020  9:38 AM CDT -----  Regarding: Appointment  Contact: patient  Type:  Same Day Appointment Request    Caller is requesting a same day appointment.  Caller declined first available appointment listed below.      Name of Caller:  Dari  When is the first available appointment?  September 22nd  Symptoms:  Possible bugs in hair/hair loss  Best Call Back Number:  [phone] (home)     Case number [id number]

## 2020-08-10 NOTE — TELEPHONE ENCOUNTER
Tried to reach pt. No answer, will try again later and I left a message on answering machine.    Contacting to inform pt of no openings in office today as provider is not in office today. And no openings on 8/11/2020. Will advise pt to see pcp for treatment. Next in office appt is 8/25/2020

## 2020-08-11 PROBLEM — L29.9 SCALP ITCH: Status: ACTIVE | Noted: 2020-01-01

## 2020-08-11 PROBLEM — E53.8 B12 DEFICIENCY: Status: ACTIVE | Noted: 2020-01-01

## 2020-08-11 PROBLEM — D69.6 THROMBOCYTOPENIA: Status: ACTIVE | Noted: 2020-01-01

## 2020-08-11 PROBLEM — I10 ESSENTIAL HYPERTENSION: Status: ACTIVE | Noted: 2020-01-01

## 2020-08-11 NOTE — PROGRESS NOTES
Subjective:       Patient ID: Dari Polanco is a 70 y.o. female.    Chief Complaint: Hair/Scalp Problem (itchy )    HPI     The patient is coming here today to establish a new primary care physician.    Scalp pruritus:  The patient complains of pruritus of the scalp, the symptoms are getting worse.  The patient thought that she has lice and start to use over-the-counter medication for lice, the patient stated that the symptoms do not improved.  She went to the dermatologist and told her also that she does not have lice, the patient stated that she has not been trying any prescription medication for this problem.  She was told that everything was in her mind.  She also complains that flakes are coming from her hair to her eyes and is affecting her vision.  She went to the ophthalmologist and was told that everything was okay.    B12 deficiency:  The patient receive B12 injections daily and now she is taking over-the-counter B12 by mouth.    Thrombocytopenia:  The patient last blood work showed worsening thrombocytopenia, the patient stop taking aspirin, she has not been checked since then.    Hypertension:  The patient is taking nifedipine, denies any side effects of the medication.  The patient smokes cigarettes, she does not want to quit at this time.    Past medical history, past social history was reviewed and discussed with the patient.    Review of Systems   Constitutional: Negative for activity change and appetite change.   HENT: Negative for congestion and ear discharge.    Eyes: Negative for discharge and itching.   Respiratory: Negative for choking and chest tightness.    Cardiovascular: Negative for chest pain and leg swelling.   Gastrointestinal: Negative for abdominal distention and abdominal pain.   Endocrine: Negative for cold intolerance and heat intolerance.        Scalp pruritus   Genitourinary: Negative for dysuria and flank pain.   Musculoskeletal: Negative for arthralgias and back pain.    Skin: Negative for pallor and rash.   Allergic/Immunologic: Negative for environmental allergies and food allergies.   Neurological: Negative for dizziness and facial asymmetry.   Hematological: Negative for adenopathy. Does not bruise/bleed easily.   Psychiatric/Behavioral: Negative for agitation, confusion and sleep disturbance.       Objective:      Physical Exam  Vitals signs and nursing note reviewed.   Constitutional:       General: She is not in acute distress.     Appearance: She is well-developed. She is not diaphoretic.   HENT:      Head: Normocephalic and atraumatic.      Right Ear: External ear normal.      Left Ear: External ear normal.      Nose: Nose normal.      Mouth/Throat:      Pharynx: No oropharyngeal exudate.   Eyes:      General:         Right eye: No discharge.         Left eye: No discharge.      Conjunctiva/sclera: Conjunctivae normal.   Neck:      Musculoskeletal: Neck supple.   Cardiovascular:      Rate and Rhythm: Normal rate and regular rhythm.      Heart sounds: Normal heart sounds. No murmur.   Pulmonary:      Effort: Pulmonary effort is normal. No respiratory distress.      Breath sounds: Normal breath sounds. No wheezing.   Abdominal:      General: There is no distension.      Palpations: There is no mass.      Tenderness: There is no abdominal tenderness.   Musculoskeletal:         General: No tenderness or deformity.   Skin:     Coloration: Skin is not pale.      Findings: No erythema.      Comments: The patient has presence of keratosis of the scalp   Neurological:      Cranial Nerves: No cranial nerve deficit.      Coordination: Coordination normal.   Psychiatric:         Behavior: Behavior normal.         Thought Content: Thought content normal.         Judgment: Judgment normal.         Assessment:       1. Thrombocytopenia    2. Class 2 severe obesity due to excess calories with serious comorbidity and body mass index (BMI) of 35.0 to 35.9 in adult    3. Essential  hypertension    4. Seborrheic dermatitis of scalp    5. B12 deficiency    6. Tobacco dependence        Plan:       Thrombocytopenia:  New problem workup needed  -     CBC auto differential; Future; Expected date: 08/11/2020    Class 2 severe obesity due to excess calories with serious comorbidity and body mass index (BMI) of 35.0 to 35.9 in adult:  New problem, next visit workup    Essential hypertension:  Stable    Seborrheic dermatitis of scalp:  Worsening  -     clobetasoL (TEMOVATE) 0.05 % external solution; Apply topically 2 (two) times daily.  Dispense: 25 mL; Refill: 0  -     ketoconazole (NIZORAL) 2 % shampoo; Apply topically twice a week.  Dispense: 120 mL; Refill: 1    B12 deficiency:  Uncontrolled  -     Vitamin B12; Future; Expected date: 08/11/2020    Tobacco dependence:  Worsening      The patient was strongly advised to quit tobacco, she is in the pre contemplation phase.  Will start patient on ketoconazole on clobetasol, if the symptoms do not subside, will refer the patient to the dermatologist.  Will recheck B12 and platelet count.  Healthy habits, drink plenty of water, continue with the rest of the medications.   The patient's BMI has been recorded in the chart. The patient has been provided educational materials regarding the benefits of attaining and maintaining a normal weight. We will continue to address and follow this issue during follow up visits.   Patient agreed with assessment and plan. Patient verbalized understanding.

## 2020-08-11 NOTE — PATIENT INSTRUCTIONS
Eating Heart-Healthy Food: Using the DASH Plan    Eating for your heart doesnt have to be hard or boring. You just need to know how to make healthier choices. The DASH eating plan has been developed to help you do just that. DASH stands for Dietary Approaches to Stop Hypertension. It is a plan that has been proven to be healthier for your heart and to lower your risk for high blood pressure. It can also help lower your risk for cancer, heart disease, osteoporosis, and diabetes.  Choosing from each food group  Choose foods from each of the food groups below each day. Try to get the recommended number of servings for each food group. The serving numbers are based on a diet of 2,000 calories a day. Talk to your doctor if youre unsure about your calorie needs. Along with getting the correct servings, the DASH plan also recommends a sodium intake less than 2,300 mg per day.        Grains  Servings: 6 to 8 a day  A serving is:  · 1 slice bread  · 1 ounce dry cereal  · Half a cup cooked rice, pasta or cereal  Best choices: Whole grains and any grains high in fiber. Vegetables  Servings: 4 to 5 a day  A serving is:  · 1 cup raw leafy vegetable  · Half a cup cut-up raw or cooked vegetable  · Half a cup vegetable juice  Best choices: Fresh or frozen vegetables prepared without added salt or fat.   Fruits  Servings: 4 to 5 a day  A serving is:  · 1 medium fruit  · One-quarter cup dried fruit  · Half a cup fresh, frozen, or canned fruit  · Half a cup of 100% fruit juices  Best choices: A variety of fresh fruits of different colors. Whole fruits are a better choice than fruit juices. Low-fat or fat-free dairy  Servings: 2 to 3 a day  A serving is:  · 1 cup milk  · 1 cup yogurt  · One and a half ounces cheese  Best choices: Skim or 1% milk, low-fat or fat-free yogurt or buttermilk, and low-fat cheeses.         Lean meats, poultry, fish  Servings: 6 or fewer a day  A serving is:  · 1 ounce cooked meats, poultry, or fish  · 1  egg  Best choices: Lean poultry and fish. Trim away visible fat. Broil, grill, roast, or boil instead of frying. Remove skin from poultry before eating. Limit how much red meat you eat.  Nuts, seeds, beans  Servings: 4 to 5 a week  A serving is:  · One-third cup nuts (one and a half ounces)  · 2 tablespoons nut butter or seeds  · Half a cup cooked dry beans or legumes  Best choices: Dry roasted nuts with no salt added, lentils, kidney beans, garbanzo beans, and whole hollis beans.   Fats and oils  Servings: 2 to 3 a day  A serving is:  · 1 teaspoon vegetable oil  · 1 teaspoon soft margarine  · 1 tablespoon mayonnaise  · 2 tablespoons salad dressing  Best choices: Nut and vegetable oils (nontropical vegetable oils), such as olive and canola oil. Sweets  Servings: 5 a week or fewer  A serving is:  · 1 tablespoon sugar, maple syrup, or honey  · 1 tablespoon jam or jelly  · 1 half-ounce jelly beans (about 15)  · 1 cup lemonade  Best choices: Dried fruit can be a satisfying sweet. Choose low-fat sweets. And watch your serving sizes!      For more on the DASH eating plan, visit:  www.nhlbi.nih.gov/health/health-topics/topics/dash   Date Last Reviewed: 6/1/2016  © 3905-5636 [employer]. [address] [place] [address], [place] [address]. All rights reserved. This information is not intended as a substitute for professional medical care. Always follow your healthcare professional's instructions.

## 2020-08-26 NOTE — LETTER
August 26, 2020      Karma Kuo MD  [address] [facility] [address] [place] [address]           [facility]  [address] [place] [address]  Phone: [phone]  Fax: [phone]          Patient: Dari Polanco   MR Number: [id number]   YOB: 1950   Date of Visit: 8/26/2020       Dear Dr. Karma Kuo:    Thank you for referring Dari Polanco to me for evaluation. Attached you will find relevant portions of my assessment and plan of care.    If you have questions, please do not hesitate to call me. I look forward to following Dari Polanco along with you.    Sincerely,    Chanel Hernandez MD    Enclosure  CC:  No Recipients    If you would like to receive this communication electronically, please contact [email or web address] or [phone] to request more information on [facility] [facility] [employer] access.    For providers and/or their staff who would like to refer a patient to [facility], please contact us through our one-stop-shop provider referral line, [facility] [place], at [phone].    If you feel you have received this communication in error or would no longer like to receive these types of communications, please e-mail [email or web address]

## 2020-08-26 NOTE — PROGRESS NOTES
Subjective:       Patient ID:  Dari Polanco is a 70 y.o. female who presents for   Chief Complaint   Patient presents with    Dry Skin     71 y/o F presents for initial visit for dry skin on scalp x 2 weeks.  She has been using Nizoral 2 % shampoo, clobetasol 0.05 % x 2 weeks-->no improvement.  She feels things crawling on her     history of NMSC- skin cancer by eyebrow  Denies family history of melanoma        Past Medical History:   Diagnosis Date    Arthritis     Bronchitis     Cancer     history of basal skin cancer status post excision    GERD (gastroesophageal reflux disease)     Obesity     Raynaud's syndrome     Retinal migraine     Tobacco dependence        Review of Systems   Constitutional: Negative for fever, chills and fatigue.   Respiratory: Negative for cough and shortness of breath.    Skin: Positive for itching, dry skin and activity-related sunscreen use. Negative for daily sunscreen use and wears hat.   Psychiatric/Behavioral: Negative for high stress.   Allergic/Immunologic: Positive for environmental allergies.        Objective:    Physical Exam   Constitutional: She appears well-developed and well-nourished.   Eyes:       Neurological: She is alert and oriented to person, place, and time.   Psychiatric: She has a normal mood and affect.   Skin:   Areas Examined (abnormalities noted in diagram):   Scalp / Hair Palpated and Inspected  Head / Face Inspection Performed  Neck Inspection Performed              Diagram Legend     Erythematous scaling macule/papule c/w actinic keratosis       Vascular papule c/w angioma      Pigmented verrucoid papule/plaque c/w seborrheic keratosis      Yellow umbilicated papule c/w sebaceous hyperplasia      Irregularly shaped tan macule c/w lentigo     1-2 mm smooth white papules consistent with Milia      Movable subcutaneous cyst with punctum c/w epidermal inclusion cyst      Subcutaneous movable cyst c/w pilar cyst      Firm pink to brown papule  c/w dermatofibroma      Pedunculated fleshy papule(s) c/w skin tag(s)      Evenly pigmented macule c/w junctional nevus     Mildly variegated pigmented, slightly irregular-bordered macule c/w mildly atypical nevus      Flesh colored to evenly pigmented papule c/w intradermal nevus       Pink pearly papule/plaque c/w basal cell carcinoma      Erythematous hyperkeratotic cursted plaque c/w SCC      Surgical scar with no sign of skin cancer recurrence      Open and closed comedones      Inflammatory papules and pustules      Verrucoid papule consistent consistent with wart     Erythematous eczematous patches and plaques     Dystrophic onycholytic nail with subungual debris c/w onychomycosis     Umbilicated papule    Erythematous-base heme-crusted tan verrucoid plaque consistent with inflamed seborrheic keratosis     Erythematous Silvery Scaling Plaque c/w Psoriasis     See annotation      Assessment / Plan:        Seborrheic keratoses  These are benign inherited growths without a malignant potential. Reassurance given to patient. No treatment is necessary.     Seborrheic dermatitis/Scalp pruritis  -     hydrocortisone 2.5 % cream; Apply topically 2 (two) times daily.  Dispense: 28 g; Refill: 1  -     clobetasoL (TEMOVATE) 0.05 % external solution; Apply topically 2 (two) times daily.  Dispense: 50 mL; Refill: 1  Keto shampoo alternating with OTC shampoo such as Selsun shampoo    Squamous papilloma  Asymptomatic at this time    Lentigines  These are benign hyperpigmented sun induced lesions. Daily sun protection will reduce the number of new lesions           Follow up in about 2 months (around 10/26/2020).

## 2020-08-26 NOTE — TELEPHONE ENCOUNTER
Spoke to patient. Stating she is shampooing 2x a week with the ketoconazole (Nizoral) 2% shampoo, also applying the clobetasol (Temovate) 0.05% external solution to her hair as well. Patient states she can see the white coming around eyes, getting in nose and ears, some have gotten in her mouth. She knows they are alive. The medications have stopped the itch but they are coming down onto her face. She's been using both products since 8/11. Has run out of clobetasol and has little bit of the ketoconazole shampoo left. She doesn't think they are working like they should to help the problem. Please advise.

## 2020-08-26 NOTE — TELEPHONE ENCOUNTER
----- Message from Paulino Francois sent at 8/26/2020  8:29 AM CDT -----  Type: Needs Medical Advice    Who Called:  Patient  Best Call Back Number: [phone]  Additional Information: Patient would like to discuss prescription as clobetasoL (TEMOVATE) 0.05 % external solution is not working. Please call to advise. Thanks!

## 2020-08-26 NOTE — TELEPHONE ENCOUNTER
Okay.  I would like a 2nd opinion to see the dermatologist.  Please schedule the patient, orders are in place.  Thank you

## 2020-09-09 NOTE — TELEPHONE ENCOUNTER
----- Message from Giovanny Bowles sent at 9/9/2020 10:35 AM CDT -----  Regarding: refill  Contact: Patient  Type:  RX Refill Request    Who Called:  Patient   Refill or New Rx:  Refill  RX Name and Strength:  TEMOVATE) 0.05 % external solution    How is the patient currently taking it? (ex. 1XDay):  as needed  Preferred Pharmacy with phone number:    [id number] - [place] [address] [address] [place] [address]  Phone: [phone] Fax: [phone]    Local or Mail Order:  Local  Ordering Provider:  Dr. Shiela Mcdonald Call Back Number:  [phone] (home)     Case number [id number]

## 2020-09-09 NOTE — TELEPHONE ENCOUNTER
No new care gaps identified.  Powered by [employer]. Reference number: [id number]. 9/09/2020 10:52:34 AM   MEHREEN

## 2020-09-11 PROBLEM — D69.6 THROMBOCYTOPENIA, UNSPECIFIED: Status: RESOLVED | Noted: 2020-03-12 | Resolved: 2020-01-01

## 2020-09-11 PROBLEM — D69.6 THROMBOCYTOPENIA: Status: RESOLVED | Noted: 2020-01-01 | Resolved: 2020-01-01

## 2020-09-11 NOTE — PATIENT INSTRUCTIONS
Eating Heart-Healthy Food: Using the DASH Plan    Eating for your heart doesnt have to be hard or boring. You just need to know how to make healthier choices. The DASH eating plan has been developed to help you do just that. DASH stands for Dietary Approaches to Stop Hypertension. It is a plan that has been proven to be healthier for your heart and to lower your risk for high blood pressure. It can also help lower your risk for cancer, heart disease, osteoporosis, and diabetes.  Choosing from each food group  Choose foods from each of the food groups below each day. Try to get the recommended number of servings for each food group. The serving numbers are based on a diet of 2,000 calories a day. Talk to your doctor if youre unsure about your calorie needs. Along with getting the correct servings, the DASH plan also recommends a sodium intake less than 2,300 mg per day.        Grains  Servings: 6 to 8 a day  A serving is:  · 1 slice bread  · 1 ounce dry cereal  · Half a cup cooked rice, pasta or cereal  Best choices: Whole grains and any grains high in fiber. Vegetables  Servings: 4 to 5 a day  A serving is:  · 1 cup raw leafy vegetable  · Half a cup cut-up raw or cooked vegetable  · Half a cup vegetable juice  Best choices: Fresh or frozen vegetables prepared without added salt or fat.   Fruits  Servings: 4 to 5 a day  A serving is:  · 1 medium fruit  · One-quarter cup dried fruit  · Half a cup fresh, frozen, or canned fruit  · Half a cup of 100% fruit juices  Best choices: A variety of fresh fruits of different colors. Whole fruits are a better choice than fruit juices. Low-fat or fat-free dairy  Servings: 2 to 3 a day  A serving is:  · 1 cup milk  · 1 cup yogurt  · One and a half ounces cheese  Best choices: Skim or 1% milk, low-fat or fat-free yogurt or buttermilk, and low-fat cheeses.         Lean meats, poultry, fish  Servings: 6 or fewer a day  A serving is:  · 1 ounce cooked meats, poultry, or fish  · 1  egg  Best choices: Lean poultry and fish. Trim away visible fat. Broil, grill, roast, or boil instead of frying. Remove skin from poultry before eating. Limit how much red meat you eat.  Nuts, seeds, beans  Servings: 4 to 5 a week  A serving is:  · One-third cup nuts (one and a half ounces)  · 2 tablespoons nut butter or seeds  · Half a cup cooked dry beans or legumes  Best choices: Dry roasted nuts with no salt added, lentils, kidney beans, garbanzo beans, and whole hollis beans.   Fats and oils  Servings: 2 to 3 a day  A serving is:  · 1 teaspoon vegetable oil  · 1 teaspoon soft margarine  · 1 tablespoon mayonnaise  · 2 tablespoons salad dressing  Best choices: Nut and vegetable oils (nontropical vegetable oils), such as olive and canola oil. Sweets  Servings: 5 a week or fewer  A serving is:  · 1 tablespoon sugar, maple syrup, or honey  · 1 tablespoon jam or jelly  · 1 half-ounce jelly beans (about 15)  · 1 cup lemonade  Best choices: Dried fruit can be a satisfying sweet. Choose low-fat sweets. And watch your serving sizes!      For more on the DASH eating plan, visit:  www.nhlbi.nih.gov/health/health-topics/topics/dash   Date Last Reviewed: 6/1/2016  © 7609-5657 [employer]. [address] [place] [address], [place] [address]. All rights reserved. This information is not intended as a substitute for professional medical care. Always follow your healthcare professional's instructions.          Please follow the instructions below to securely access your online medical record. [facility] allows you to send messages to your doctor, view your test results, renew your prescriptions, schedule appointments, and more.     How Do I Sign Up?  1. In your Internet browser, go to [email or web address]  2. In the lower right of the page, click the Activate Now link located under the Have Access Code? .  3. Enter your [facility] Access Code exactly as it appears below. You will not need to use this code after youve  completed the sign-up process.  [facility] Access Code: Activation code not generated  Current Patient Portal Status: Patient Declined    4. Enter Date of Birth (mm/dd/yyyy) as indicated and click the Next button. You will be taken to the next sign-up page.  5. Create a [facility] ID. This will be your new [facility] login ID and cannot be changed, so think of one that is secure and easy to remember.  6. Create a [facility] password.  Your password must be at least 8 characters long and contain at least 1 letter and 1 number.  You can change your password at any time.  Your password is case sensitive.  7. Enter your Password Reset Question and Answer, then click the Next button.   8. Enter your e-mail address. You will receive e-mail notification when new information is available in [facility].  9. Click Sign Up. You can now view your medical record.     Additional Information  If you have questions, you can e-mail [email or web address] or call [phone] to talk to our [facility] staff. Remember, [facility] is NOT to be used for urgent needs. For medical emergencies, dial 911.    Thank you for enrolling in [facility].

## 2020-09-11 NOTE — PROGRESS NOTES
Subjective:       Patient ID: Dari Polanco is a 70 y.o. female.    Chief Complaint: Hair/Scalp Problem (itching)    HPI     Scalp pruritus:  The patient stated the symptoms are improved considerably, after start using the clobetasol, she also went to see the dermatologist and recommend the same medication.  The patient stated that the than true is improved.    Back pain:  The patient complains of back problems with pain and discomfort, the symptoms are getting worse.  The patient has degenerative arthritis, The patient stated that she was doing therapy and helped tremendously, she will like to restart but she will need if therapy place that is close to her house, she will contact us when she this I were to go.    Raynaud phenomena:  Complains of discoloration on the hands and feet and is taking medication for that.    Past medical history, past social history was reviewed and discussed with the patient.    Review of Systems   Constitutional: Positive for activity change. Negative for appetite change.   HENT: Negative for congestion and ear discharge.    Eyes: Negative for discharge and itching.   Respiratory: Negative for choking and chest tightness.    Cardiovascular: Negative for chest pain and leg swelling.   Gastrointestinal: Negative for abdominal distention, abdominal pain and constipation.   Endocrine: Negative for cold intolerance and heat intolerance.   Genitourinary: Negative for dysuria and flank pain.   Musculoskeletal: Positive for arthralgias and back pain.   Skin: Positive for rash. Negative for pallor.   Allergic/Immunologic: Negative for environmental allergies and food allergies.   Neurological: Negative for dizziness and facial asymmetry.   Hematological: Negative for adenopathy. Does not bruise/bleed easily.   Psychiatric/Behavioral: Negative for agitation, confusion and sleep disturbance.       Objective:      Physical Exam  Vitals signs and nursing note reviewed.   Constitutional:        General: She is not in acute distress.     Appearance: She is well-developed. She is obese. She is not diaphoretic.      Comments: Limited ambulation   HENT:      Head: Normocephalic and atraumatic.      Right Ear: External ear normal.      Left Ear: External ear normal.      Nose: Nose normal.      Mouth/Throat:      Pharynx: No oropharyngeal exudate.   Eyes:      General:         Right eye: No discharge.         Left eye: No discharge.      Conjunctiva/sclera: Conjunctivae normal.      Pupils: Pupils are equal, round, and reactive to light.   Neck:      Musculoskeletal: Neck supple.   Cardiovascular:      Rate and Rhythm: Normal rate and regular rhythm.      Heart sounds: Normal heart sounds. No murmur.   Pulmonary:      Effort: Pulmonary effort is normal. No respiratory distress.      Breath sounds: Normal breath sounds. No wheezing.   Abdominal:      General: There is no distension.      Palpations: There is no mass.      Tenderness: There is no abdominal tenderness.   Musculoskeletal:         General: No tenderness or deformity.   Skin:     Coloration: Skin is not pale.      Findings: No erythema.   Neurological:      Cranial Nerves: No cranial nerve deficit.      Coordination: Coordination normal.   Psychiatric:         Behavior: Behavior normal.         Thought Content: Thought content normal.         Judgment: Judgment normal.         Assessment:       1. Scalp itch    2. Physical deconditioning    3. Class 2 severe obesity due to excess calories with serious comorbidity and body mass index (BMI) of 35.0 to 35.9 in adult    4. Raynaud's phenomenon without gangrene    5. DDD (degenerative disc disease), lumbar        Plan:       Scalp itch:  Improved    Physical deconditioning:  Worsening    Class 2 severe obesity due to excess calories with serious comorbidity and body mass index (BMI) of 35.0 to 35.9 in adult:  Stable     Raynaud's phenomenon without gangrene:  Stable    DDD (degenerative disc disease),  lumbar:  Worsening  -     meloxicam (MOBIC) 7.5 MG tablet; Take 1 tablet (7.5 mg total) by mouth daily as needed for Pain. TAKE 1 TABLET(7.5 MG) BY MOUTH EVERY DAY AS NEEDED  Dispense: 90 tablet; Refill: 3      I spent 30 min in this encounter, from this time more than 50% of the time was spent in counseling and plan of care for this patient.  The patient will contact us if she in her mind about physical therapy.  The patient's BMI has been recorded in the chart. The patient has been provided educational materials regarding the benefits of attaining and maintaining a normal weight. We will continue to address and follow this issue during follow up visits.   Patient agreed with assessment and plan. Patient verbalized understanding.

## 2020-09-14 NOTE — TELEPHONE ENCOUNTER
----- Message from Pilar Gonzalez sent at 9/11/2020  2:33 PM CDT -----  Contact: pt  Type: Needs Medical Advice    Who Called:  pt  Best Call Back Number: [phone]  Additional Information: Requesting a call back regarding [employer] will cover pt going to [facility] [address]   [phone]  Please Advise ---Thank you

## 2020-09-18 NOTE — TELEPHONE ENCOUNTER
----- Message from Theresa Altman sent at 9/18/2020 11:34 AM CDT -----  Type: Needs Medical Advice  Who Called:  Patient  Pharmacy name and phone #:    [id number] - [place] [address] [facility] [address]  [address] [place] [id number]  Phone: [phone] Fax: [phone]  Best Call Back Number: [phone]  Additional Information: calling to find out if she can get a refill of clobetasol propionate as she is still having trouble with the things in her hair. She wanted to let Dr Kuo know also that she scheduled an appointment to see Dr Hernandez in October.

## 2020-09-18 NOTE — TELEPHONE ENCOUNTER
No new care gaps identified.  Powered by [employer]. Reference number: [id number]. 9/18/2020 11:47:17 AM   A[place]ONT

## 2020-10-15 NOTE — PROGRESS NOTES
Subjective:       Patient ID:  Dari Polanco is a 70 y.o. female who presents for   Chief Complaint   Patient presents with    Follow-up     70 y.o. F presents for follow up for dry skin on scalp.  Patient is unsure of medications prescribed at last visit so unable to verify if used or if any improvement. She thinks there are bugs crawling on her skin and more precisely her eyelids.  She feels them crawling    LOV 8/26/2020    Yes Phx of NMSC- skin cancer by marques  no Fhx of melanoma.        Past Medical History:   Diagnosis Date    Arthritis     Bronchitis     Cancer     history of basal skin cancer status post excision    GERD (gastroesophageal reflux disease)     Obesity     Raynaud's syndrome     Retinal migraine     Tobacco dependence        Review of Systems   Constitutional: Negative for fever, chills and fatigue.   Respiratory: Negative for cough and shortness of breath.    Skin: Positive for itching, dry skin and activity-related sunscreen use. Negative for daily sunscreen use and wears hat.   Psychiatric/Behavioral: Negative for high stress.   Allergic/Immunologic: Positive for environmental allergies.        Objective:    Physical Exam   Constitutional: She appears well-developed and well-nourished.   HENT:   Mouth/Throat: Lips normal.    Eyes: Lids are normal.  No conjunctival no injection.       Neurological: She is alert and oriented to person, place, and time.   Psychiatric: She has a normal mood and affect.   Skin:   Areas Examined (abnormalities noted in diagram):   Scalp / Hair Palpated and Inspected  Head / Face Inspection Performed  Neck Inspection Performed  Chest / Axilla Inspection Performed  Abdomen Inspection Performed  Back Inspection Performed  RUE Inspected  LUE Inspection Performed              Diagram Legend     Erythematous scaling macule/papule c/w actinic keratosis       Vascular papule c/w angioma      Pigmented verrucoid papule/plaque c/w seborrheic keratosis       Yellow umbilicated papule c/w sebaceous hyperplasia      Irregularly shaped tan macule c/w lentigo     1-2 mm smooth white papules consistent with Milia      Movable subcutaneous cyst with punctum c/w epidermal inclusion cyst      Subcutaneous movable cyst c/w pilar cyst      Firm pink to brown papule c/w dermatofibroma      Pedunculated fleshy papule(s) c/w skin tag(s)      Evenly pigmented macule c/w junctional nevus     Mildly variegated pigmented, slightly irregular-bordered macule c/w mildly atypical nevus      Flesh colored to evenly pigmented papule c/w intradermal nevus       Pink pearly papule/plaque c/w basal cell carcinoma      Erythematous hyperkeratotic cursted plaque c/w SCC      Surgical scar with no sign of skin cancer recurrence      Open and closed comedones      Inflammatory papules and pustules      Verrucoid papule consistent consistent with wart     Erythematous eczematous patches and plaques     Dystrophic onycholytic nail with subungual debris c/w onychomycosis     Umbilicated papule    Erythematous-base heme-crusted tan verrucoid plaque consistent with inflamed seborrheic keratosis     Erythematous Silvery Scaling Plaque c/w Psoriasis     See annotation  LABS 9/2020  CBC with slightly elevated MCV, plts normal  TSH 3/2020 WNL  Assessment / Plan:        Scalp itch/Pruritic condition  There is no evidence of infestation  Pt would like to bring what she collects from her scalp to her next OV. I told her that I would examine it    Ketoconazole shampoo daily-qod  Ok to use clobetasol soln if this helps  Zyrtec 10 mg OTC may help    -     Comprehensive Metabolic Panel; Future; Expected date: 10/15/2020    May check CXR if pruritis continues         Follow up for Pending labs.

## 2020-10-26 NOTE — TELEPHONE ENCOUNTER
----- Message from Tanner Chi sent at 10/26/2020 10:40 AM CDT -----  Type: Needs Medical Advice  Who Called:  Patient    Best Call Back Number: [phone]  Additional Information: Patient states that she would like a callback regarding her new medication from Dr. Walker:  Hydroxyzine HCL 25 MG  Patient states that the medication has a made a wonderful difference

## 2020-11-25 PROBLEM — R47.01 APHASIA: Status: ACTIVE | Noted: 2020-01-01

## 2020-11-25 PROBLEM — Z71.89 ADVANCE CARE PLANNING: Status: ACTIVE | Noted: 2020-01-01

## 2020-11-25 PROBLEM — R47.89 DYSFLUENCY: Status: ACTIVE | Noted: 2020-01-01

## 2020-11-26 PROBLEM — I67.2 ATHEROSCLEROTIC CEREBROVASCULAR DISEASE: Status: ACTIVE | Noted: 2020-01-01

## 2020-11-26 PROBLEM — R47.01 APHASIA: Status: RESOLVED | Noted: 2020-01-01 | Resolved: 2020-01-01

## 2020-11-26 PROBLEM — R29.818 TRANSIENT NEUROLOGICAL SYMPTOMS: Status: ACTIVE | Noted: 2020-01-01

## 2020-11-26 PROBLEM — G45.9 TRANSIENT ISCHEMIC ATTACK: Status: ACTIVE | Noted: 2020-01-01

## 2020-11-27 NOTE — TELEPHONE ENCOUNTER
----- Message from Giovanny Bowles sent at 11/27/2020 11:31 AM CST -----  Regarding: Advice  Contact: patient  Patient want to speak with a nurse regarding advice on hospital stay before she schedule appointment please call back at [phone]

## 2020-11-30 NOTE — TELEPHONE ENCOUNTER
----- Message from Theresa Altman sent at 11/30/2020  3:51 PM CST -----  Type: Needs Medical Advice  Who Called:  Patient  Best Call Back Number:   Additional Information: Calling to find out if there is a way that her appointment on wednesday can be moved to a morning spot if possible so that she will have a ride.

## 2020-12-02 NOTE — PROGRESS NOTES
Subjective:       Patient ID: Dari Polanco is a 70 y.o. female    Chief Complaint: Hospital Follow Up    HPI  The patinet presents today for hospital follow up.  She is here today with her daughter.  She was admitted to [facility] from 11/25/2020- 11/26/2020.  She went to the ED by ambulance after she had trouble remembering words when she was talking to her daughter on the phone.  Workup included CT head, CTA, ECHo and neurology consult. She saw Dr Castrejon, neurology.     Her sister had a glioblastoma that was missed for many years.       Sensation of bungs:  Reports that she can feel bugs crawling on her head and forehead every day.  She does not get a rash and does not ever see the bugs.  She has seen multiple dermatologists for this problem and was told that she has seborrheic dermatitis and was prescribed a shampoo and cream.  Her daughter had scabies long ago and gave her the rest of her tube of permethrin cream.  She has been using this on face and neck intermittently.  She has been looking things up on the Internet and has diagnosed herself with mites but does not ever get bug bites.      Back pain:  Also she has a long history of lower back pain.  For the past few weeks she has been having pain in the left lower back.  She denies any injury or triggering event.    Review of Systems   Constitutional: Negative for activity change, chills and fever.   HENT: Negative for congestion and sore throat.    Eyes: Negative for visual disturbance.   Respiratory: Negative for cough and shortness of breath.    Cardiovascular: Negative for chest pain and palpitations.   Gastrointestinal: Negative for abdominal pain, diarrhea, nausea and vomiting.   Endocrine: Negative for polydipsia and polyuria.   Musculoskeletal: Positive for back pain. Negative for myalgias.   Skin: Negative for rash.   Neurological: Negative for headaches.   Psychiatric/Behavioral: The patient is not nervous/anxious.         Objective:   Physical  Exam  Constitutional:       General: She is not in acute distress.     Appearance: She is well-developed. She is not diaphoretic.   HENT:      Head: Normocephalic and atraumatic.   Eyes:      Pupils: Pupils are equal, round, and reactive to light.   Neck:      Musculoskeletal: Normal range of motion and neck supple.   Cardiovascular:      Rate and Rhythm: Normal rate and regular rhythm.      Heart sounds: Normal heart sounds. No murmur. No friction rub. No gallop.    Pulmonary:      Effort: Pulmonary effort is normal. No respiratory distress.      Breath sounds: Normal breath sounds. No wheezing or rales.   Chest:      Chest wall: No tenderness.   Abdominal:      General: Bowel sounds are normal. There is no distension.      Palpations: Abdomen is soft. There is no mass.      Tenderness: There is no abdominal tenderness. There is no guarding.   Musculoskeletal: Normal range of motion.         General: Tenderness (Tenderness to palpation over the left lumbar paraspinal muscles) present.   Skin:     General: Skin is warm and dry.      Findings: No rash.   Neurological:      Mental Status: She is alert and oriented to person, place, and time.   Psychiatric:         Behavior: Behavior normal.         Thought Content: Thought content normal.         Judgment: Judgment normal.          Assessment:       1. TIA (transient ischemic attack)  Ambulatory referral/consult to Neurology   2. Dorsalgia, unspecified  X-Ray Lumbar Spine AP And Lateral   3. Other symptoms and signs involving the nervous system  MRI Brain W WO Contrast   4. Formication          Plan:       TIA (transient ischemic attack)  -     Ambulatory referral/consult to Neurology; Future; Expected date: 12/09/2020    Dorsalgia, unspecified  -     X-Ray Lumbar Spine AP And Lateral; Future; Expected date: 12/02/2020    Other symptoms and signs involving the nervous system  -     MRI Brain W WO Contrast; Future; Expected date: 12/02/2020    Formication  -  recommended that stop using the permethrin cream p.r.n.  - she will return for follow-up to discuss this further

## 2020-12-04 NOTE — TELEPHONE ENCOUNTER
----- Message from Luis Fernando Leo sent at 12/4/2020  3:15 PM CST -----  Regarding: Pt [phone]  Patient is returning a phone call.  Who left a message for the patient: Abbie  Does patient know what this is regarding:  No  Comments:

## 2020-12-08 NOTE — TELEPHONE ENCOUNTER
Spoke with patient.verbalized understanding.  She will be using [employer] to get to her PT appointments so she will set it up soon.

## 2020-12-08 NOTE — TELEPHONE ENCOUNTER
----- Message from Taylor Chicas sent at 12/7/2020  4:05 PM CST -----  Contact: Patient [phone]  Type:  Test Results    Who Called: Patient     Name of Test (Lab/Mammo/Etc): Xray    Date of Test: 12-02-20    Where the test was performed: [facility]    Would the patient rather a call back or a response via [facility]? Call back    Best Call Back Number: [phone]    Additional Information:  Left a message earlier to get the results and no one has returned her call.     For Clinical Team:Has the provider reviewed the results?

## 2020-12-08 NOTE — TELEPHONE ENCOUNTER
"----- Message from Sharmila Hardy sent at 12/7/2020 10:07 AM CST -----  Regarding: Retuurning Call  Name of Who is Calling:  Dari Polanco      What is the request in detail:   Patient called stating, "she's returning the office's call and would like you to please call again."  Thank you    Reply by MY [facility]:  no      Call Back : [phone]                                        "

## 2020-12-09 NOTE — TELEPHONE ENCOUNTER
----- Message from Arianna Santizo sent at 12/9/2020 11:08 AM CST -----  Contact: self  Type: Needs Medical Advice  Who Called:  patient    Best Call Back Number:[phone]   Additional Information: patient is requesting  a recommendation on who she should see in neurology, Dr. Castrejon does not see patient's s in the clinic, and he is who she was referred to. Please contact to advise.

## 2020-12-22 NOTE — PROGRESS NOTES
Care Everywhere updates requested and reviewed.  Immunizations reconciled. Media reports reviewed.  Duplicate HM overrides and  orders removed.  Overdue HM topic chart audit and/or requested.  Overdue lab testing linked to upcoming lab appointments if applies.

## 2020-12-29 NOTE — TELEPHONE ENCOUNTER
----- Message from Arianna Santizo sent at 12/28/2020  2:12 PM CST -----  Contact: self  Type: Needs Medical Advice  Who Called:  patient   Best Call Back Number: [phone]  Additional Information: having MRI tomorrow, has questions regarding medication before MRI, and discuss a couple things that have happened to her recently. Neck popping, jaw popping, pain in left hip upon standing up

## 2020-12-31 NOTE — TELEPHONE ENCOUNTER
----- Message from Arianna Santizo sent at 12/28/2020  2:12 PM CST -----  Contact: self  Type: Needs Medical Advice  Who Called:  patient   Best Call Back Number: [phone]  Additional Information: having MRI tomorrow, has questions regarding medication before MRI, and discuss a couple things that have happened to her recently. Neck popping, jaw popping, pain in left hip upon standing up

## 2021-01-01 ENCOUNTER — PES CALL (OUTPATIENT)
Dept: ADMINISTRATIVE | Facility: CLINIC | Age: 71
End: 2021-01-01

## 2021-01-01 ENCOUNTER — HOSPITAL ENCOUNTER (OUTPATIENT)
Dept: RADIOLOGY | Facility: HOSPITAL | Age: 71
Discharge: HOME | End: 2021-02-03
Attending: NURSE PRACTITIONER
Payer: MEDICARE

## 2021-01-01 ENCOUNTER — OFFICE VISIT (OUTPATIENT)
Dept: NEUROLOGY | Facility: CLINIC | Age: 71
End: 2021-01-01
Payer: MEDICARE

## 2021-01-01 ENCOUNTER — TELEPHONE (OUTPATIENT)
Dept: OPHTHALMOLOGY | Facility: CLINIC | Age: 71
End: 2021-01-01

## 2021-01-01 ENCOUNTER — TELEPHONE (OUTPATIENT)
Dept: FAMILY MEDICINE | Facility: CLINIC | Age: 71
End: 2021-01-01

## 2021-01-01 ENCOUNTER — TELEPHONE (OUTPATIENT)
Dept: NEUROLOGY | Facility: CLINIC | Age: 71
End: 2021-01-01

## 2021-01-01 ENCOUNTER — HOSPITAL ENCOUNTER (OUTPATIENT)
Dept: RADIOLOGY | Facility: HOSPITAL | Age: 71
Discharge: HOME | End: 2021-04-15
Attending: NURSE PRACTITIONER
Payer: MEDICARE

## 2021-01-01 ENCOUNTER — LAB VISIT (OUTPATIENT)
Dept: LAB | Facility: HOSPITAL | Age: 71
End: 2021-01-01
Attending: NURSE PRACTITIONER
Payer: MEDICARE

## 2021-01-01 ENCOUNTER — OFFICE VISIT (OUTPATIENT)
Dept: FAMILY MEDICINE | Facility: CLINIC | Age: 71
End: 2021-01-01
Payer: MEDICARE

## 2021-01-01 ENCOUNTER — PATIENT OUTREACH (OUTPATIENT)
Dept: ADMINISTRATIVE | Facility: HOSPITAL | Age: 71
End: 2021-01-01

## 2021-01-01 ENCOUNTER — OFFICE VISIT (OUTPATIENT)
Dept: OPHTHALMOLOGY | Facility: CLINIC | Age: 71
End: 2021-01-01
Payer: MEDICARE

## 2021-01-01 ENCOUNTER — OFFICE VISIT (OUTPATIENT)
Dept: OPTOMETRY | Facility: CLINIC | Age: 71
End: 2021-01-01
Payer: MEDICARE

## 2021-01-01 VITALS
BODY MASS INDEX: 35.25 KG/M2 | HEIGHT: 62 IN | DIASTOLIC BLOOD PRESSURE: 76 MMHG | WEIGHT: 191.56 LBS | SYSTOLIC BLOOD PRESSURE: 128 MMHG | OXYGEN SATURATION: 96 % | HEART RATE: 78 BPM

## 2021-01-01 VITALS
HEIGHT: 62 IN | DIASTOLIC BLOOD PRESSURE: 70 MMHG | SYSTOLIC BLOOD PRESSURE: 132 MMHG | BODY MASS INDEX: 33.51 KG/M2 | TEMPERATURE: 97 F | HEART RATE: 68 BPM | WEIGHT: 182.13 LBS

## 2021-01-01 VITALS
TEMPERATURE: 97 F | RESPIRATION RATE: 17 BRPM | HEART RATE: 68 BPM | BODY MASS INDEX: 33.32 KG/M2 | DIASTOLIC BLOOD PRESSURE: 79 MMHG | SYSTOLIC BLOOD PRESSURE: 141 MMHG | WEIGHT: 182.19 LBS

## 2021-01-01 VITALS
DIASTOLIC BLOOD PRESSURE: 68 MMHG | BODY MASS INDEX: 33.47 KG/M2 | HEART RATE: 65 BPM | TEMPERATURE: 99 F | OXYGEN SATURATION: 99 % | HEIGHT: 62 IN | WEIGHT: 181.88 LBS | SYSTOLIC BLOOD PRESSURE: 138 MMHG

## 2021-01-01 VITALS
RESPIRATION RATE: 16 BRPM | HEART RATE: 88 BPM | TEMPERATURE: 97 F | BODY MASS INDEX: 32.1 KG/M2 | WEIGHT: 175.5 LBS | DIASTOLIC BLOOD PRESSURE: 64 MMHG | SYSTOLIC BLOOD PRESSURE: 115 MMHG

## 2021-01-01 DIAGNOSIS — H52.03 HYPEROPIA WITH ASTIGMATISM AND PRESBYOPIA, BILATERAL: ICD-10-CM

## 2021-01-01 DIAGNOSIS — H53.8 BLURRED VISION, BILATERAL: ICD-10-CM

## 2021-01-01 DIAGNOSIS — R47.89 DYSFLUENCY: ICD-10-CM

## 2021-01-01 DIAGNOSIS — R60.9 EDEMA, UNSPECIFIED TYPE: ICD-10-CM

## 2021-01-01 DIAGNOSIS — I73.9 PERIPHERAL VASCULAR DISEASE: ICD-10-CM

## 2021-01-01 DIAGNOSIS — L29.9 SCALP ITCH: ICD-10-CM

## 2021-01-01 DIAGNOSIS — E66.09 CLASS 1 OBESITY DUE TO EXCESS CALORIES WITH SERIOUS COMORBIDITY AND BODY MASS INDEX (BMI) OF 33.0 TO 33.9 IN ADULT: ICD-10-CM

## 2021-01-01 DIAGNOSIS — I67.2 ATHEROSCLEROTIC CEREBROVASCULAR DISEASE: ICD-10-CM

## 2021-01-01 DIAGNOSIS — R93.89 ABNORMAL MRI: ICD-10-CM

## 2021-01-01 DIAGNOSIS — R60.0 LOCALIZED EDEMA: Primary | ICD-10-CM

## 2021-01-01 DIAGNOSIS — R41.3 MEMORY LOSS: ICD-10-CM

## 2021-01-01 DIAGNOSIS — Z12.2 ENCOUNTER FOR SCREENING FOR MALIGNANT NEOPLASM OF RESPIRATORY ORGANS: ICD-10-CM

## 2021-01-01 DIAGNOSIS — H25.13 NUCLEAR SCLEROSIS, BILATERAL: Primary | ICD-10-CM

## 2021-01-01 DIAGNOSIS — L60.9 NAIL ABNORMALITIES: ICD-10-CM

## 2021-01-01 DIAGNOSIS — H52.4 HYPEROPIA WITH ASTIGMATISM AND PRESBYOPIA, BILATERAL: ICD-10-CM

## 2021-01-01 DIAGNOSIS — L21.9 SEBORRHEIC DERMATITIS OF SCALP: ICD-10-CM

## 2021-01-01 DIAGNOSIS — I10 ESSENTIAL HYPERTENSION: ICD-10-CM

## 2021-01-01 DIAGNOSIS — R26.89 IMBALANCE: ICD-10-CM

## 2021-01-01 DIAGNOSIS — Z00.00 ENCOUNTER FOR PREVENTIVE HEALTH EXAMINATION: Primary | ICD-10-CM

## 2021-01-01 DIAGNOSIS — M50.30 DDD (DEGENERATIVE DISC DISEASE), CERVICAL: Primary | ICD-10-CM

## 2021-01-01 DIAGNOSIS — H43.393 VITREOUS FLOATERS, BILATERAL: ICD-10-CM

## 2021-01-01 DIAGNOSIS — G45.9 TRANSIENT ISCHEMIC ATTACK: ICD-10-CM

## 2021-01-01 DIAGNOSIS — Z13.5 GLAUCOMA SCREENING: ICD-10-CM

## 2021-01-01 DIAGNOSIS — H52.203 HYPEROPIA WITH ASTIGMATISM AND PRESBYOPIA, BILATERAL: ICD-10-CM

## 2021-01-01 DIAGNOSIS — E53.8 B12 DEFICIENCY: Primary | ICD-10-CM

## 2021-01-01 DIAGNOSIS — E78.49 OTHER HYPERLIPIDEMIA: ICD-10-CM

## 2021-01-01 DIAGNOSIS — G45.9 TIA (TRANSIENT ISCHEMIC ATTACK): ICD-10-CM

## 2021-01-01 DIAGNOSIS — H25.13 NUCLEAR SCLEROSIS, BILATERAL: ICD-10-CM

## 2021-01-01 DIAGNOSIS — I73.00 RAYNAUD'S DISEASE WITHOUT GANGRENE: ICD-10-CM

## 2021-01-01 DIAGNOSIS — D69.2 PURPURA SENILIS: ICD-10-CM

## 2021-01-01 DIAGNOSIS — E53.8 B12 DEFICIENCY: ICD-10-CM

## 2021-01-01 DIAGNOSIS — R60.9 EDEMA, UNSPECIFIED TYPE: Primary | ICD-10-CM

## 2021-01-01 DIAGNOSIS — Z72.0 TOBACCO USE: ICD-10-CM

## 2021-01-01 DIAGNOSIS — R26.9 ABNORMALITY OF GAIT AND MOBILITY: ICD-10-CM

## 2021-01-01 DIAGNOSIS — L29.9 SCALP PRURITUS: ICD-10-CM

## 2021-01-01 DIAGNOSIS — H10.503 BLEPHAROCONJUNCTIVITIS OF BOTH EYES, UNSPECIFIED BLEPHAROCONJUNCTIVITIS TYPE: ICD-10-CM

## 2021-01-01 DIAGNOSIS — H25.12 AGE-RELATED NUCLEAR CATARACT OF LEFT EYE: Primary | ICD-10-CM

## 2021-01-01 LAB
AMMONIA PLAS-SCNC: 27 UMOL/L (ref 10–50)
CREAT SERPL-MCNC: 0.8 MG/DL (ref 0.5–1.4)
EST. GFR  (AFRICAN AMERICAN): >60 ML/MIN/1.73 M^2
EST. GFR  (NON AFRICAN AMERICAN): >60 ML/MIN/1.73 M^2
FOLATE SERPL-MCNC: 5.9 NG/ML (ref 4–24)
METHYLMALONATE SERPL-SCNC: 0.34 UMOL/L
MISCELLANEOUS TEST NAME: NORMAL
REFERENCE LAB: NORMAL
REFERENCE RANGE: NORMAL
RPR SER QL: NORMAL
SPECIMEN TYPE: NORMAL
TEST RESULT: NORMAL
VIT B1 BLD-MCNC: 49 UG/L (ref 38–122)
VIT B12 SERPL-MCNC: 419 PG/ML (ref 210–950)

## 2021-01-01 PROCEDURE — 82140 ASSAY OF AMMONIA: CPT

## 2021-01-01 PROCEDURE — 83921 ORGANIC ACID SINGLE QUANT: CPT

## 2021-01-01 PROCEDURE — 99214 OFFICE O/P EST MOD 30 MIN: CPT | Mod: S$GLB,,, | Performed by: FAMILY MEDICINE

## 2021-01-01 PROCEDURE — 99999 PR PBB SHADOW E&M-EST. PATIENT-LVL IV: ICD-10-PCS | Mod: PBBFAC,,, | Performed by: OPTOMETRIST

## 2021-01-01 PROCEDURE — 1126F AMNT PAIN NOTED NONE PRSNT: CPT | Mod: HCNC,S$GLB,, | Performed by: NURSE PRACTITIONER

## 2021-01-01 PROCEDURE — 70553 MRI BRAIN W WO CONTRAST: ICD-10-PCS | Mod: 26,HCNC,, | Performed by: RADIOLOGY

## 2021-01-01 PROCEDURE — 1159F PR MEDICATION LIST DOCUMENTED IN MEDICAL RECORD: ICD-10-PCS | Mod: S$GLB,,, | Performed by: FAMILY MEDICINE

## 2021-01-01 PROCEDURE — 84425 ASSAY OF VITAMIN B-1: CPT

## 2021-01-01 PROCEDURE — 3288F PR FALLS RISK ASSESSMENT DOCUMENTED: ICD-10-PCS | Mod: HCNC,CPTII,S$GLB, | Performed by: NURSE PRACTITIONER

## 2021-01-01 PROCEDURE — 36415 COLL VENOUS BLD VENIPUNCTURE: CPT | Mod: PO

## 2021-01-01 PROCEDURE — 3288F FALL RISK ASSESSMENT DOCD: CPT | Mod: HCNC,CPTII,S$GLB, | Performed by: OPHTHALMOLOGY

## 2021-01-01 PROCEDURE — 82746 ASSAY OF FOLIC ACID SERUM: CPT

## 2021-01-01 PROCEDURE — 99999 PR PBB SHADOW E&M-EST. PATIENT-LVL IV: CPT | Mod: PBBFAC,,, | Performed by: NURSE PRACTITIONER

## 2021-01-01 PROCEDURE — 1101F PR PT FALLS ASSESS DOC 0-1 FALLS W/OUT INJ PAST YR: ICD-10-PCS | Mod: HCNC,CPTII,S$GLB, | Performed by: NURSE PRACTITIONER

## 2021-01-01 PROCEDURE — 99999 PR PBB SHADOW E&M-EST. PATIENT-LVL III: CPT | Mod: PBBFAC,HCNC,, | Performed by: OPHTHALMOLOGY

## 2021-01-01 PROCEDURE — 1159F PR MEDICATION LIST DOCUMENTED IN MEDICAL RECORD: ICD-10-PCS | Mod: S$GLB,,, | Performed by: NURSE PRACTITIONER

## 2021-01-01 PROCEDURE — 1126F PR PAIN SEVERITY QUANTIFIED, NO PAIN PRESENT: ICD-10-PCS | Mod: HCNC,S$GLB,, | Performed by: NURSE PRACTITIONER

## 2021-01-01 PROCEDURE — 70553 MRI BRAIN STEM W/O & W/DYE: CPT | Mod: 26,HCNC,, | Performed by: RADIOLOGY

## 2021-01-01 PROCEDURE — 3008F BODY MASS INDEX DOCD: CPT | Mod: HCNC,CPTII,S$GLB, | Performed by: NURSE PRACTITIONER

## 2021-01-01 PROCEDURE — G0439 PR MEDICARE ANNUAL WELLNESS SUBSEQUENT VISIT: ICD-10-PCS | Mod: HCNC,S$GLB,, | Performed by: NURSE PRACTITIONER

## 2021-01-01 PROCEDURE — 99214 OFFICE O/P EST MOD 30 MIN: CPT | Mod: S$GLB,,, | Performed by: NURSE PRACTITIONER

## 2021-01-01 PROCEDURE — 99999 PR PBB SHADOW E&M-EST. PATIENT-LVL IV: CPT | Mod: PBBFAC,,, | Performed by: OPTOMETRIST

## 2021-01-01 PROCEDURE — 1101F PR PT FALLS ASSESS DOC 0-1 FALLS W/OUT INJ PAST YR: ICD-10-PCS | Mod: CPTII,S$GLB,, | Performed by: FAMILY MEDICINE

## 2021-01-01 PROCEDURE — 3074F SYST BP LT 130 MM HG: CPT | Mod: CPTII,S$GLB,, | Performed by: NURSE PRACTITIONER

## 2021-01-01 PROCEDURE — 99999 PR PBB SHADOW E&M-EST. PATIENT-LVL IV: CPT | Mod: PBBFAC,HCNC,, | Performed by: NURSE PRACTITIONER

## 2021-01-01 PROCEDURE — 3008F PR BODY MASS INDEX (BMI) DOCUMENTED: ICD-10-PCS | Mod: HCNC,CPTII,S$GLB, | Performed by: NURSE PRACTITIONER

## 2021-01-01 PROCEDURE — 3075F PR MOST RECENT SYSTOLIC BLOOD PRESS GE 130-139MM HG: ICD-10-PCS | Mod: CPTII,S$GLB,, | Performed by: FAMILY MEDICINE

## 2021-01-01 PROCEDURE — 3075F SYST BP GE 130 - 139MM HG: CPT | Mod: CPTII,S$GLB,, | Performed by: FAMILY MEDICINE

## 2021-01-01 PROCEDURE — 3288F FALL RISK ASSESSMENT DOCD: CPT | Mod: CPTII,S$GLB,, | Performed by: NURSE PRACTITIONER

## 2021-01-01 PROCEDURE — 99999 PR PBB SHADOW E&M-EST. PATIENT-LVL III: ICD-10-PCS | Mod: PBBFAC,HCNC,, | Performed by: OPHTHALMOLOGY

## 2021-01-01 PROCEDURE — 99204 OFFICE O/P NEW MOD 45 MIN: CPT | Mod: HCNC,S$GLB,, | Performed by: OPHTHALMOLOGY

## 2021-01-01 PROCEDURE — 1158F PR ADVANCE CARE PLANNING DISCUSS DOCUMENTED IN MEDICAL RECORD: ICD-10-PCS | Mod: HCNC,S$GLB,, | Performed by: NURSE PRACTITIONER

## 2021-01-01 PROCEDURE — 3078F PR MOST RECENT DIASTOLIC BLOOD PRESSURE < 80 MM HG: ICD-10-PCS | Mod: CPTII,S$GLB,, | Performed by: FAMILY MEDICINE

## 2021-01-01 PROCEDURE — 99215 OFFICE O/P EST HI 40 MIN: CPT | Mod: HCNC,S$GLB,, | Performed by: NURSE PRACTITIONER

## 2021-01-01 PROCEDURE — 99499 RISK ADDL DX/OHS AUDIT: ICD-10-PCS | Mod: S$GLB,,, | Performed by: NURSE PRACTITIONER

## 2021-01-01 PROCEDURE — 1126F PR PAIN SEVERITY QUANTIFIED, NO PAIN PRESENT: ICD-10-PCS | Mod: S$GLB,,, | Performed by: NURSE PRACTITIONER

## 2021-01-01 PROCEDURE — 99214 PR OFFICE/OUTPT VISIT, EST, LEVL IV, 30-39 MIN: ICD-10-PCS | Mod: S$GLB,,, | Performed by: FAMILY MEDICINE

## 2021-01-01 PROCEDURE — 3288F FALL RISK ASSESSMENT DOCD: CPT | Mod: HCNC,CPTII,S$GLB, | Performed by: NURSE PRACTITIONER

## 2021-01-01 PROCEDURE — 3288F FALL RISK ASSESSMENT DOCD: CPT | Mod: CPTII,S$GLB,, | Performed by: FAMILY MEDICINE

## 2021-01-01 PROCEDURE — 3078F PR MOST RECENT DIASTOLIC BLOOD PRESSURE < 80 MM HG: ICD-10-PCS | Mod: HCNC,CPTII,S$GLB, | Performed by: NURSE PRACTITIONER

## 2021-01-01 PROCEDURE — 99215 PR OFFICE/OUTPT VISIT, EST, LEVL V, 40-54 MIN: ICD-10-PCS | Mod: HCNC,S$GLB,, | Performed by: NURSE PRACTITIONER

## 2021-01-01 PROCEDURE — 3288F PR FALLS RISK ASSESSMENT DOCUMENTED: ICD-10-PCS | Mod: CPTII,S$GLB,, | Performed by: NURSE PRACTITIONER

## 2021-01-01 PROCEDURE — 1101F PR PT FALLS ASSESS DOC 0-1 FALLS W/OUT INJ PAST YR: ICD-10-PCS | Mod: CPTII,S$GLB,, | Performed by: NURSE PRACTITIONER

## 2021-01-01 PROCEDURE — 99214 PR OFFICE/OUTPT VISIT, EST, LEVL IV, 30-39 MIN: ICD-10-PCS | Mod: S$GLB,,, | Performed by: NURSE PRACTITIONER

## 2021-01-01 PROCEDURE — 1159F MED LIST DOCD IN RCRD: CPT | Mod: S$GLB,,, | Performed by: NURSE PRACTITIONER

## 2021-01-01 PROCEDURE — 82565 ASSAY OF CREATININE: CPT

## 2021-01-01 PROCEDURE — 1101F PT FALLS ASSESS-DOCD LE1/YR: CPT | Mod: HCNC,CPTII,S$GLB, | Performed by: OPHTHALMOLOGY

## 2021-01-01 PROCEDURE — 3288F PR FALLS RISK ASSESSMENT DOCUMENTED: ICD-10-PCS | Mod: CPTII,S$GLB,, | Performed by: OPTOMETRIST

## 2021-01-01 PROCEDURE — 3078F DIAST BP <80 MM HG: CPT | Mod: HCNC,CPTII,S$GLB, | Performed by: NURSE PRACTITIONER

## 2021-01-01 PROCEDURE — 1126F PR PAIN SEVERITY QUANTIFIED, NO PAIN PRESENT: ICD-10-PCS | Mod: S$GLB,,, | Performed by: OPTOMETRIST

## 2021-01-01 PROCEDURE — 1126F AMNT PAIN NOTED NONE PRSNT: CPT | Mod: HCNC,S$GLB,, | Performed by: OPHTHALMOLOGY

## 2021-01-01 PROCEDURE — 3078F DIAST BP <80 MM HG: CPT | Mod: CPTII,S$GLB,, | Performed by: NURSE PRACTITIONER

## 2021-01-01 PROCEDURE — 3288F PR FALLS RISK ASSESSMENT DOCUMENTED: ICD-10-PCS | Mod: CPTII,S$GLB,, | Performed by: FAMILY MEDICINE

## 2021-01-01 PROCEDURE — 99499 UNLISTED E&M SERVICE: CPT | Mod: S$GLB,,, | Performed by: FAMILY MEDICINE

## 2021-01-01 PROCEDURE — 99999 PR PBB SHADOW E&M-EST. PATIENT-LVL V: CPT | Mod: PBBFAC,HCNC,, | Performed by: NURSE PRACTITIONER

## 2021-01-01 PROCEDURE — 3008F BODY MASS INDEX DOCD: CPT | Mod: CPTII,S$GLB,, | Performed by: FAMILY MEDICINE

## 2021-01-01 PROCEDURE — 3078F PR MOST RECENT DIASTOLIC BLOOD PRESSURE < 80 MM HG: ICD-10-PCS | Mod: CPTII,S$GLB,, | Performed by: NURSE PRACTITIONER

## 2021-01-01 PROCEDURE — 99999 PR PBB SHADOW E&M-EST. PATIENT-LVL V: ICD-10-PCS | Mod: PBBFAC,HCNC,, | Performed by: NURSE PRACTITIONER

## 2021-01-01 PROCEDURE — 3008F PR BODY MASS INDEX (BMI) DOCUMENTED: ICD-10-PCS | Mod: CPTII,S$GLB,, | Performed by: NURSE PRACTITIONER

## 2021-01-01 PROCEDURE — 1159F MED LIST DOCD IN RCRD: CPT | Mod: HCNC,S$GLB,, | Performed by: NURSE PRACTITIONER

## 2021-01-01 PROCEDURE — 70553 MRI BRAIN STEM W/O & W/DYE: CPT | Mod: TC,HCNC,PO

## 2021-01-01 PROCEDURE — 25500020 PHARM REV CODE 255: Mod: HCNC,PO | Performed by: NURSE PRACTITIONER

## 2021-01-01 PROCEDURE — A9585 GADOBUTROL INJECTION: HCPCS | Mod: HCNC,PO | Performed by: NURSE PRACTITIONER

## 2021-01-01 PROCEDURE — 93970 EXTREMITY STUDY: CPT | Mod: 26,,, | Performed by: RADIOLOGY

## 2021-01-01 PROCEDURE — 1101F PT FALLS ASSESS-DOCD LE1/YR: CPT | Mod: CPTII,S$GLB,, | Performed by: NURSE PRACTITIONER

## 2021-01-01 PROCEDURE — 1101F PT FALLS ASSESS-DOCD LE1/YR: CPT | Mod: HCNC,CPTII,S$GLB, | Performed by: NURSE PRACTITIONER

## 2021-01-01 PROCEDURE — 1126F PR PAIN SEVERITY QUANTIFIED, NO PAIN PRESENT: ICD-10-PCS | Mod: HCNC,S$GLB,, | Performed by: OPHTHALMOLOGY

## 2021-01-01 PROCEDURE — 3074F SYST BP LT 130 MM HG: CPT | Mod: HCNC,CPTII,S$GLB, | Performed by: NURSE PRACTITIONER

## 2021-01-01 PROCEDURE — 1126F PR PAIN SEVERITY QUANTIFIED, NO PAIN PRESENT: ICD-10-PCS | Mod: S$GLB,,, | Performed by: FAMILY MEDICINE

## 2021-01-01 PROCEDURE — 3288F PR FALLS RISK ASSESSMENT DOCUMENTED: ICD-10-PCS | Mod: HCNC,CPTII,S$GLB, | Performed by: OPHTHALMOLOGY

## 2021-01-01 PROCEDURE — 1159F MED LIST DOCD IN RCRD: CPT | Mod: S$GLB,,, | Performed by: FAMILY MEDICINE

## 2021-01-01 PROCEDURE — 92004 PR EYE EXAM, NEW PATIENT,COMPREHESV: ICD-10-PCS | Mod: S$GLB,,, | Performed by: OPTOMETRIST

## 2021-01-01 PROCEDURE — 93970 EXTREMITY STUDY: CPT | Mod: TC,PO

## 2021-01-01 PROCEDURE — 99999 PR PBB SHADOW E&M-EST. PATIENT-LVL IV: ICD-10-PCS | Mod: PBBFAC,HCNC,, | Performed by: NURSE PRACTITIONER

## 2021-01-01 PROCEDURE — 1126F AMNT PAIN NOTED NONE PRSNT: CPT | Mod: S$GLB,,, | Performed by: NURSE PRACTITIONER

## 2021-01-01 PROCEDURE — 3074F PR MOST RECENT SYSTOLIC BLOOD PRESSURE < 130 MM HG: ICD-10-PCS | Mod: CPTII,S$GLB,, | Performed by: NURSE PRACTITIONER

## 2021-01-01 PROCEDURE — 3008F BODY MASS INDEX DOCD: CPT | Mod: CPTII,S$GLB,, | Performed by: NURSE PRACTITIONER

## 2021-01-01 PROCEDURE — 3078F DIAST BP <80 MM HG: CPT | Mod: CPTII,S$GLB,, | Performed by: FAMILY MEDICINE

## 2021-01-01 PROCEDURE — 92004 COMPRE OPH EXAM NEW PT 1/>: CPT | Mod: S$GLB,,, | Performed by: OPTOMETRIST

## 2021-01-01 PROCEDURE — 1159F PR MEDICATION LIST DOCUMENTED IN MEDICAL RECORD: ICD-10-PCS | Mod: HCNC,S$GLB,, | Performed by: NURSE PRACTITIONER

## 2021-01-01 PROCEDURE — 1159F PR MEDICATION LIST DOCUMENTED IN MEDICAL RECORD: ICD-10-PCS | Mod: HCNC,S$GLB,, | Performed by: OPHTHALMOLOGY

## 2021-01-01 PROCEDURE — 82607 VITAMIN B-12: CPT

## 2021-01-01 PROCEDURE — 92015 DETERMINE REFRACTIVE STATE: CPT | Mod: HCNC,S$GLB,, | Performed by: OPHTHALMOLOGY

## 2021-01-01 PROCEDURE — 3008F PR BODY MASS INDEX (BMI) DOCUMENTED: ICD-10-PCS | Mod: CPTII,S$GLB,, | Performed by: FAMILY MEDICINE

## 2021-01-01 PROCEDURE — G0439 PPPS, SUBSEQ VISIT: HCPCS | Mod: HCNC,S$GLB,, | Performed by: NURSE PRACTITIONER

## 2021-01-01 PROCEDURE — 93970 US LOWER EXTREMITY VEINS BILATERAL INSUFFICIENCY: ICD-10-PCS | Mod: 26,,, | Performed by: RADIOLOGY

## 2021-01-01 PROCEDURE — 99499 UNLISTED E&M SERVICE: CPT | Mod: S$GLB,,, | Performed by: NURSE PRACTITIONER

## 2021-01-01 PROCEDURE — 1101F PR PT FALLS ASSESS DOC 0-1 FALLS W/OUT INJ PAST YR: ICD-10-PCS | Mod: CPTII,S$GLB,, | Performed by: OPTOMETRIST

## 2021-01-01 PROCEDURE — 1126F AMNT PAIN NOTED NONE PRSNT: CPT | Mod: S$GLB,,, | Performed by: FAMILY MEDICINE

## 2021-01-01 PROCEDURE — 1101F PT FALLS ASSESS-DOCD LE1/YR: CPT | Mod: CPTII,S$GLB,, | Performed by: OPTOMETRIST

## 2021-01-01 PROCEDURE — 99999 PR PBB SHADOW E&M-EST. PATIENT-LVL IV: CPT | Mod: PBBFAC,,, | Performed by: FAMILY MEDICINE

## 2021-01-01 PROCEDURE — 1159F MED LIST DOCD IN RCRD: CPT | Mod: HCNC,S$GLB,, | Performed by: OPHTHALMOLOGY

## 2021-01-01 PROCEDURE — 3074F PR MOST RECENT SYSTOLIC BLOOD PRESSURE < 130 MM HG: ICD-10-PCS | Mod: HCNC,CPTII,S$GLB, | Performed by: NURSE PRACTITIONER

## 2021-01-01 PROCEDURE — 1126F AMNT PAIN NOTED NONE PRSNT: CPT | Mod: S$GLB,,, | Performed by: OPTOMETRIST

## 2021-01-01 PROCEDURE — 1101F PT FALLS ASSESS-DOCD LE1/YR: CPT | Mod: CPTII,S$GLB,, | Performed by: FAMILY MEDICINE

## 2021-01-01 PROCEDURE — 1101F PR PT FALLS ASSESS DOC 0-1 FALLS W/OUT INJ PAST YR: ICD-10-PCS | Mod: HCNC,CPTII,S$GLB, | Performed by: OPHTHALMOLOGY

## 2021-01-01 PROCEDURE — 99999 PR PBB SHADOW E&M-EST. PATIENT-LVL IV: ICD-10-PCS | Mod: PBBFAC,,, | Performed by: NURSE PRACTITIONER

## 2021-01-01 PROCEDURE — 3288F FALL RISK ASSESSMENT DOCD: CPT | Mod: CPTII,S$GLB,, | Performed by: OPTOMETRIST

## 2021-01-01 PROCEDURE — 1158F ADVNC CARE PLAN TLK DOCD: CPT | Mod: HCNC,S$GLB,, | Performed by: NURSE PRACTITIONER

## 2021-01-01 PROCEDURE — 99499 RISK ADDL DX/OHS AUDIT: ICD-10-PCS | Mod: S$GLB,,, | Performed by: FAMILY MEDICINE

## 2021-01-01 PROCEDURE — 92015 PR REFRACTION: ICD-10-PCS | Mod: HCNC,S$GLB,, | Performed by: OPHTHALMOLOGY

## 2021-01-01 PROCEDURE — 99999 PR PBB SHADOW E&M-EST. PATIENT-LVL IV: ICD-10-PCS | Mod: PBBFAC,,, | Performed by: FAMILY MEDICINE

## 2021-01-01 PROCEDURE — 99204 PR OFFICE/OUTPT VISIT, NEW, LEVL IV, 45-59 MIN: ICD-10-PCS | Mod: HCNC,S$GLB,, | Performed by: OPHTHALMOLOGY

## 2021-01-01 PROCEDURE — 86592 SYPHILIS TEST NON-TREP QUAL: CPT

## 2021-01-01 RX ORDER — LANOLIN ALCOHOL/MO/W.PET/CERES
400 CREAM (GRAM) TOPICAL DAILY
Refills: 0 | COMMUNITY
Start: 2021-01-01

## 2021-01-01 RX ORDER — GADOBUTROL 604.72 MG/ML
8 INJECTION INTRAVENOUS
Status: COMPLETED | OUTPATIENT
Start: 2021-01-01 | End: 2021-01-01

## 2021-01-01 RX ORDER — NEOMYCIN SULFATE, POLYMYXIN B SULFATE, AND DEXAMETHASONE 3.5; 10000; 1 MG/G; [USP'U]/G; MG/G
OINTMENT OPHTHALMIC
Qty: 3.5 G | Refills: 1 | Status: SHIPPED | OUTPATIENT
Start: 2021-01-01 | End: 2022-03-24

## 2021-01-01 RX ORDER — ATORVASTATIN CALCIUM 40 MG/1
40 TABLET, FILM COATED ORAL DAILY
Qty: 90 TABLET | Refills: 3 | Status: SHIPPED | OUTPATIENT
Start: 2021-01-01 | End: 2022-01-05

## 2021-01-01 RX ORDER — TRIAMCINOLONE ACETONIDE 1 MG/G
CREAM TOPICAL
COMMUNITY
Start: 2021-01-01

## 2021-01-01 RX ORDER — CLOBETASOL PROPIONATE 0.46 MG/ML
SOLUTION TOPICAL DAILY
Qty: 50 ML | Refills: 0 | Status: SHIPPED | OUTPATIENT
Start: 2021-01-01

## 2021-01-01 RX ORDER — HYDROCHLOROTHIAZIDE 12.5 MG/1
12.5 TABLET ORAL DAILY
Qty: 30 TABLET | Refills: 11 | Status: SHIPPED | OUTPATIENT
Start: 2021-01-01 | End: 2022-03-08

## 2021-01-01 RX ORDER — IVERMECTIN 3 MG/1
3 TABLET ORAL WEEKLY
COMMUNITY
Start: 2021-01-01

## 2021-01-01 RX ORDER — FLUOXETINE HYDROCHLORIDE 20 MG/1
20 CAPSULE ORAL DAILY
Qty: 30 CAPSULE | Refills: 2 | Status: SHIPPED | OUTPATIENT
Start: 2021-01-01 | End: 2021-01-01

## 2021-01-01 RX ORDER — CLOBETASOL PROPIONATE 0.46 MG/ML
SOLUTION TOPICAL DAILY
Qty: 50 ML | Refills: 1 | Status: SHIPPED | OUTPATIENT
Start: 2021-01-01 | End: 2021-01-01 | Stop reason: SDUPTHER

## 2021-01-01 RX ADMIN — GADOBUTROL 8 ML: 604.72 INJECTION INTRAVENOUS at 11:04

## 2021-01-05 PROBLEM — R93.89 ABNORMAL MRI: Status: ACTIVE | Noted: 2021-01-01

## 2021-01-05 PROBLEM — R41.3 MEMORY LOSS: Status: ACTIVE | Noted: 2021-01-01

## 2021-03-08 PROBLEM — L60.9 NAIL ABNORMALITIES: Status: ACTIVE | Noted: 2021-01-01

## 2021-03-08 PROBLEM — R60.0 LOCALIZED EDEMA: Status: ACTIVE | Noted: 2021-01-01

## 2021-03-08 PROBLEM — D69.2 PURPURA SENILIS: Status: ACTIVE | Noted: 2021-01-01

## 2021-04-19 PROBLEM — R26.89 IMBALANCE: Status: ACTIVE | Noted: 2021-01-01

## 2021-05-09 PROBLEM — Z71.89 ACP (ADVANCE CARE PLANNING): Status: ACTIVE | Noted: 2021-01-01

## 2021-05-09 PROBLEM — I61.9 ICH (INTRACEREBRAL HEMORRHAGE): Status: ACTIVE | Noted: 2021-01-01

## 2021-05-09 PROBLEM — I68.0 CEREBRAL AMYLOID ANGIOPATHY: Status: ACTIVE | Noted: 2021-01-01

## 2021-05-09 PROBLEM — E85.4 CEREBRAL AMYLOID ANGIOPATHY: Status: ACTIVE | Noted: 2021-01-01

## 2021-05-09 PROBLEM — J96.01 ACUTE HYPOXEMIC RESPIRATORY FAILURE: Status: ACTIVE | Noted: 2021-01-01

## 2021-05-09 PROBLEM — G93.5 UNCAL HERNIATION: Status: ACTIVE | Noted: 2021-01-01

## 2021-05-09 PROBLEM — Z51.5 END OF LIFE CARE: Status: ACTIVE | Noted: 2021-01-01

## 2021-05-09 PROBLEM — E87.6 HYPOKALEMIA: Status: ACTIVE | Noted: 2021-01-01

## 2021-05-10 PROBLEM — Z51.5 COMFORT MEASURES ONLY STATUS: Status: ACTIVE | Noted: 2021-01-01

## 2022-11-28 NOTE — TELEPHONE ENCOUNTER
Spoke with Pt and Pt wants to know how much B12 to take per day?   [facility]